# Patient Record
Sex: MALE | Race: BLACK OR AFRICAN AMERICAN | Employment: UNEMPLOYED | URBAN - METROPOLITAN AREA
[De-identification: names, ages, dates, MRNs, and addresses within clinical notes are randomized per-mention and may not be internally consistent; named-entity substitution may affect disease eponyms.]

---

## 2017-10-30 ENCOUNTER — HOSPITAL ENCOUNTER (OUTPATIENT)
Dept: SLEEP CENTER | Age: 62
Discharge: HOME | End: 2017-10-30
Payer: MEDICARE

## 2017-10-30 VITALS
HEIGHT: 71 IN | DIASTOLIC BLOOD PRESSURE: 73 MMHG | WEIGHT: 213 LBS | SYSTOLIC BLOOD PRESSURE: 125 MMHG | RESPIRATION RATE: 22 BRPM | HEART RATE: 78 BPM | BODY MASS INDEX: 29.82 KG/M2

## 2017-10-30 DIAGNOSIS — G47.33 OSA (OBSTRUCTIVE SLEEP APNEA): Primary | ICD-10-CM

## 2017-10-30 PROCEDURE — 95810 POLYSOM 6/> YRS 4/> PARAM: CPT

## 2017-10-30 RX ORDER — SIMVASTATIN 40 MG
40 TABLET ORAL DAILY
COMMUNITY

## 2017-10-30 RX ORDER — DILTIAZEM HYDROCHLORIDE 120 MG/1
120 CAPSULE, EXTENDED RELEASE ORAL DAILY
COMMUNITY

## 2017-10-30 RX ORDER — BUPROPION HYDROCHLORIDE 150 MG/1
150 TABLET ORAL DAILY
COMMUNITY

## 2017-10-30 RX ORDER — LISINOPRIL 10 MG/1
10 TABLET ORAL DAILY
COMMUNITY

## 2017-10-30 RX ORDER — GABAPENTIN 100 MG/1
100 CAPSULE ORAL DAILY
COMMUNITY

## 2017-10-30 RX ORDER — TAMSULOSIN HYDROCHLORIDE 0.4 MG/1
0.4 CAPSULE ORAL DAILY
COMMUNITY

## 2017-10-30 RX ORDER — ALPRAZOLAM 0.5 MG/1
0.5 TABLET ORAL NIGHTLY
COMMUNITY

## 2017-10-30 RX ORDER — ALBUTEROL SULFATE 90 UG/1
2 AEROSOL, METERED RESPIRATORY (INHALATION) EVERY 4 HOURS
COMMUNITY

## 2017-10-30 RX ORDER — ALBUTEROL SULFATE 2.5 MG/3ML
2.5 SOLUTION RESPIRATORY (INHALATION) 4 TIMES DAILY
COMMUNITY

## 2017-10-30 RX ORDER — METOPROLOL SUCCINATE 25 MG/1
25 TABLET, EXTENDED RELEASE ORAL DAILY
COMMUNITY

## 2017-11-09 ENCOUNTER — HOSPITAL ENCOUNTER (OUTPATIENT)
Dept: PULMONOLOGY | Age: 62
Setting detail: THERAPIES SERIES
Discharge: HOME | End: 2017-11-09
Payer: MEDICARE

## 2017-11-09 VITALS — BODY MASS INDEX: 31.94 KG/M2 | WEIGHT: 229 LBS

## 2017-11-09 PROCEDURE — G0424 PULMONARY REHAB W EXER: HCPCS

## 2018-01-10 ENCOUNTER — HOSPITAL ENCOUNTER (OUTPATIENT)
Dept: PULMONOLOGY | Age: 63
Setting detail: THERAPIES SERIES
Discharge: HOME | End: 2018-01-10
Payer: MEDICARE

## 2018-01-10 VITALS — BODY MASS INDEX: 31.52 KG/M2 | WEIGHT: 226 LBS

## 2018-01-10 PROCEDURE — G0424 PULMONARY REHAB W EXER: HCPCS

## 2019-12-04 ENCOUNTER — APPOINTMENT (OUTPATIENT)
Dept: GENERAL RADIOLOGY | Age: 64
End: 2019-12-04
Payer: MEDICARE

## 2019-12-04 ENCOUNTER — HOSPITAL ENCOUNTER (EMERGENCY)
Age: 64
Discharge: HOME | End: 2019-12-04
Attending: EMERGENCY MEDICINE
Payer: MEDICARE

## 2019-12-04 VITALS
HEART RATE: 86 BPM | RESPIRATION RATE: 21 BRPM | TEMPERATURE: 98.2 F | HEIGHT: 71 IN | SYSTOLIC BLOOD PRESSURE: 129 MMHG | OXYGEN SATURATION: 96 % | DIASTOLIC BLOOD PRESSURE: 77 MMHG | BODY MASS INDEX: 30.8 KG/M2 | WEIGHT: 220 LBS

## 2019-12-04 DIAGNOSIS — T17.308A CHOKING, INITIAL ENCOUNTER: Primary | ICD-10-CM

## 2019-12-04 PROCEDURE — 71048 X-RAY EXAM CHEST 4+ VIEWS: CPT

## 2019-12-04 PROCEDURE — 99284 EMERGENCY DEPT VISIT MOD MDM: CPT

## 2019-12-04 ASSESSMENT — ENCOUNTER SYMPTOMS
NAUSEA: 0
EYE REDNESS: 0
CHEST TIGHTNESS: 0
COLOR CHANGE: 0
WHEEZING: 0
SORE THROAT: 0
CONSTIPATION: 0
BLOOD IN STOOL: 0
DIARRHEA: 0
COUGH: 1
RHINORRHEA: 0
TROUBLE SWALLOWING: 0
SINUS PRESSURE: 0
CHOKING: 1
EYE DISCHARGE: 0
FACIAL SWELLING: 0
VOMITING: 0
SHORTNESS OF BREATH: 1
EYE PAIN: 0
BACK PAIN: 0
ABDOMINAL PAIN: 0

## 2019-12-04 ASSESSMENT — PAIN DESCRIPTION - LOCATION: LOCATION: HIP

## 2019-12-04 ASSESSMENT — PAIN DESCRIPTION - PAIN TYPE: TYPE: CHRONIC PAIN

## 2021-07-01 ENCOUNTER — HOSPITAL ENCOUNTER (OUTPATIENT)
Age: 66
Setting detail: SPECIMEN
Discharge: HOME | End: 2021-07-01
Payer: MEDICARE

## 2021-07-01 LAB
AMPHETAMINE SCREEN URINE: NEGATIVE
BARBITURATE SCREEN URINE: NEGATIVE
BENZODIAZEPINE SCREEN, URINE: POSITIVE
BUPRENORPHINE URINE: ABNORMAL
CANNABINOID SCREEN URINE: NEGATIVE
COCAINE METABOLITE, URINE: NEGATIVE
MDMA URINE: ABNORMAL
METHADONE SCREEN, URINE: NEGATIVE
METHAMPHETAMINE, URINE: ABNORMAL
OPIATES, URINE: NEGATIVE
OXYCODONE SCREEN URINE: NEGATIVE
PHENCYCLIDINE, URINE: NEGATIVE
PROPOXYPHENE, URINE: ABNORMAL
TEST INFORMATION: ABNORMAL
TRICYCLIC ANTIDEPRESSANTS, UR: ABNORMAL

## 2021-10-22 ENCOUNTER — HOSPITAL ENCOUNTER (OUTPATIENT)
Age: 66
Setting detail: SPECIMEN
Discharge: HOME | End: 2021-10-22
Payer: MEDICARE

## 2021-10-22 LAB
-: NORMAL
REASON FOR REJECTION: NORMAL
ZZ NTE CLEAN UP: ORDERED TEST: NORMAL
ZZ NTE WITH NAME CLEAN UP: SPECIMEN SOURCE: NORMAL

## 2021-10-25 ENCOUNTER — HOSPITAL ENCOUNTER (OUTPATIENT)
Age: 66
Setting detail: SPECIMEN
Discharge: HOME | End: 2021-10-25
Payer: MEDICARE

## 2021-10-27 LAB
ALCOHOL URINE: NEGATIVE MG/DL
AMPHETAMINE SCREEN URINE: NEGATIVE NG/ML
BARBITURATE SCREEN URINE: NEGATIVE NG/ML
BENZODIAZEPINE SCREEN, URINE: NEGATIVE NG/ML
CANNABINOID SCREEN URINE: NEGATIVE NG/ML
COCAINE(METAB.)SCREEN, URINE: NEGATIVE NG/ML
CREATININE URINE: 23.7 MG/DL (ref 20–400)
Lab: NORMAL
METHADONE SCREEN, URINE: NEGATIVE NG/ML
OPIATES, URINE: NEGATIVE NG/ML
PHENCYCLIDINE, URINE: NEGATIVE NG/ML
PROPOXYPHENE, URINE: NEGATIVE NG/ML

## 2021-11-17 ENCOUNTER — HOSPITAL ENCOUNTER (OUTPATIENT)
Age: 66
Setting detail: SPECIMEN
Discharge: HOME | End: 2021-11-17

## 2021-11-20 LAB
ALCOHOL URINE: NEGATIVE MG/DL
AMPHETAMINE SCREEN URINE: NEGATIVE NG/ML
BARBITURATE SCREEN URINE: NEGATIVE NG/ML
BENZODIAZEPINE SCREEN, URINE: POSITIVE NG/ML
CANNABINOID SCREEN URINE: NEGATIVE NG/ML
COCAINE(METAB.)SCREEN, URINE: NEGATIVE NG/ML
CREATININE URINE: 155.3 MG/DL (ref 20–400)
Lab: NORMAL
METHADONE SCREEN, URINE: NEGATIVE NG/ML
OPIATES, URINE: NEGATIVE NG/ML
PHENCYCLIDINE, URINE: NEGATIVE NG/ML
PROPOXYPHENE, URINE: NEGATIVE NG/ML

## 2021-11-21 LAB
ALPRAZOLAM: 116 NG/ML
CHLORDIAZEPOXIDE: <20 NG/ML
CLONAZEPAM, URINE SCREEN: <5 NG/ML
DIAZEPAM URINE QUANT: <20 NG/ML
LORAZEPAM: <20 NG/ML
MIDAZOLAM, URINE SCREEN: <20 NG/ML
MIDAZOLAM, URINE SCREEN: <20 NG/ML
NORDIAZEPAM URINE SCREEN: <20 NG/ML
OH-ALPRAZOLAM SCREEN: 167 NG/ML
OXAZEPAM URINE SCREEN: <20 NG/ML
TEMAZEPAM,URINE SCREEN: <20 NG/ML
UR 7-AMINOCLONAZEPAM: <5 NG/ML

## 2023-08-24 ENCOUNTER — HOSPITAL ENCOUNTER (OUTPATIENT)
Age: 68
Setting detail: SPECIMEN
Discharge: HOME | End: 2023-08-24

## 2023-08-24 LAB
ANION GAP SERPL CALCULATED.3IONS-SCNC: 15 MMOL/L (ref 9–17)
BUN SERPL-MCNC: 25 MG/DL (ref 8–23)
CALCIUM SERPL-MCNC: 9.9 MG/DL (ref 8.6–10.4)
CHLORIDE SERPL-SCNC: 97 MMOL/L (ref 98–107)
CHOLEST SERPL-MCNC: 276 MG/DL
CHOLESTEROL/HDL RATIO: 3.4
CO2 SERPL-SCNC: 27 MMOL/L (ref 20–31)
CREAT SERPL-MCNC: 1.1 MG/DL (ref 0.7–1.2)
ERYTHROCYTE [DISTWIDTH] IN BLOOD BY AUTOMATED COUNT: 14.4 % (ref 11.8–14.4)
EST. AVERAGE GLUCOSE BLD GHB EST-MCNC: 258 MG/DL
GFR SERPL CREATININE-BSD FRML MDRD: >60 ML/MIN/1.73M2
GLUCOSE SERPL-MCNC: 217 MG/DL (ref 70–99)
HBA1C MFR BLD: 10.6 % (ref 4–6)
HCT VFR BLD AUTO: 42.2 % (ref 40.7–50.3)
HDLC SERPL-MCNC: 81 MG/DL
HGB BLD-MCNC: 13.8 G/DL (ref 13–17)
LDLC SERPL CALC-MCNC: 153 MG/DL (ref 0–130)
MAGNESIUM SERPL-MCNC: 1.8 MG/DL (ref 1.6–2.6)
MCH RBC QN AUTO: 32.2 PG (ref 25.2–33.5)
MCHC RBC AUTO-ENTMCNC: 32.7 G/DL (ref 28.4–34.8)
MCV RBC AUTO: 98.6 FL (ref 82.6–102.9)
NRBC BLD-RTO: 0 PER 100 WBC
PLATELET # BLD AUTO: 142 K/UL (ref 138–453)
PMV BLD AUTO: 11.3 FL (ref 8.1–13.5)
POTASSIUM SERPL-SCNC: 4.1 MMOL/L (ref 3.7–5.3)
PSA SERPL-MCNC: 2.32 NG/ML
RBC # BLD AUTO: 4.28 M/UL (ref 4.21–5.77)
SODIUM SERPL-SCNC: 139 MMOL/L (ref 135–144)
TRIGL SERPL-MCNC: 211 MG/DL
TSH SERPL DL<=0.05 MIU/L-ACNC: 0.71 UIU/ML (ref 0.3–5)
WBC OTHER # BLD: 11 K/UL (ref 3.5–11.3)

## 2024-06-26 ENCOUNTER — HOSPITAL ENCOUNTER (OUTPATIENT)
Age: 69
Setting detail: SPECIMEN
Discharge: HOME | End: 2024-06-26

## 2024-06-26 LAB
25(OH)D3 SERPL-MCNC: 15.7 NG/ML (ref 30–100)
ALBUMIN SERPL-MCNC: 4.2 G/DL (ref 3.5–5.2)
ALBUMIN/GLOB SERPL: 1 {RATIO} (ref 1–2.5)
ALP SERPL-CCNC: 117 U/L (ref 40–129)
ALT SERPL-CCNC: 29 U/L (ref 10–50)
ANION GAP SERPL CALCULATED.3IONS-SCNC: 12 MMOL/L (ref 9–16)
AST SERPL-CCNC: 21 U/L (ref 10–50)
BILIRUB SERPL-MCNC: 0.3 MG/DL (ref 0–1.2)
BNP SERPL-MCNC: 41 PG/ML (ref 0–300)
BUN SERPL-MCNC: 23 MG/DL (ref 8–23)
CALCIUM SERPL-MCNC: 9.6 MG/DL (ref 8.6–10.4)
CHLORIDE SERPL-SCNC: 95 MMOL/L (ref 98–107)
CHOLEST SERPL-MCNC: 255 MG/DL (ref 0–199)
CHOLESTEROL/HDL RATIO: 3
CO2 SERPL-SCNC: 31 MMOL/L (ref 20–31)
CREAT SERPL-MCNC: 1.1 MG/DL (ref 0.7–1.2)
ERYTHROCYTE [DISTWIDTH] IN BLOOD BY AUTOMATED COUNT: 14.7 % (ref 11.8–14.4)
EST. AVERAGE GLUCOSE BLD GHB EST-MCNC: 289 MG/DL
FOLATE SERPL-MCNC: 12.5 NG/ML (ref 4.8–24.2)
GFR, ESTIMATED: 77 ML/MIN/1.73M2
GLUCOSE SERPL-MCNC: 295 MG/DL (ref 74–99)
HBA1C MFR BLD: 11.7 % (ref 4–6)
HCT VFR BLD AUTO: 43.2 % (ref 40.7–50.3)
HDLC SERPL-MCNC: 87 MG/DL
HGB BLD-MCNC: 13.4 G/DL (ref 13–17)
INR PPP: 1.1
LDLC SERPL CALC-MCNC: 128 MG/DL (ref 0–100)
MAGNESIUM SERPL-MCNC: 1.8 MG/DL (ref 1.6–2.4)
MCH RBC QN AUTO: 31.3 PG (ref 25.2–33.5)
MCHC RBC AUTO-ENTMCNC: 31 G/DL (ref 28.4–34.8)
MCV RBC AUTO: 100.9 FL (ref 82.6–102.9)
NRBC BLD-RTO: 0 PER 100 WBC
PLATELET # BLD AUTO: 148 K/UL (ref 138–453)
PMV BLD AUTO: 11.7 FL (ref 8.1–13.5)
POTASSIUM SERPL-SCNC: 4.7 MMOL/L (ref 3.7–5.3)
PROT SERPL-MCNC: 7.1 G/DL (ref 6.6–8.7)
PROTHROMBIN TIME: 13.6 SEC (ref 11.7–14.9)
RBC # BLD AUTO: 4.28 M/UL (ref 4.21–5.77)
SODIUM SERPL-SCNC: 138 MMOL/L (ref 136–145)
TRIGL SERPL-MCNC: 198 MG/DL
TSH SERPL DL<=0.05 MIU/L-ACNC: 0.73 UIU/ML (ref 0.27–4.2)
VIT B12 SERPL-MCNC: 493 PG/ML (ref 232–1245)
VLDLC SERPL CALC-MCNC: 40 MG/DL
WBC OTHER # BLD: 11.9 K/UL (ref 3.5–11.3)

## 2024-10-04 ENCOUNTER — HOSPITAL ENCOUNTER (OUTPATIENT)
Age: 69
Setting detail: SPECIMEN
Discharge: HOME | End: 2024-10-04

## 2024-10-04 LAB
ANION GAP SERPL CALCULATED.3IONS-SCNC: 10 MMOL/L (ref 9–16)
BUN SERPL-MCNC: 24 MG/DL (ref 8–23)
CALCIUM SERPL-MCNC: 9.1 MG/DL (ref 8.6–10.4)
CHLORIDE SERPL-SCNC: 98 MMOL/L (ref 98–107)
CO2 SERPL-SCNC: 32 MMOL/L (ref 20–31)
CREAT SERPL-MCNC: 1.3 MG/DL (ref 0.7–1.2)
EST. AVERAGE GLUCOSE BLD GHB EST-MCNC: 240 MG/DL
GFR, ESTIMATED: 62 ML/MIN/1.73M2
GLUCOSE SERPL-MCNC: 143 MG/DL (ref 74–99)
HBA1C MFR BLD: 10 % (ref 4–6)
POTASSIUM SERPL-SCNC: 4 MMOL/L (ref 3.7–5.3)
SODIUM SERPL-SCNC: 140 MMOL/L (ref 136–145)

## 2024-12-09 ENCOUNTER — HOSPITAL ENCOUNTER (OUTPATIENT)
Age: 69
Setting detail: SPECIMEN
Discharge: HOME | End: 2024-12-09

## 2024-12-09 LAB
25(OH)D3 SERPL-MCNC: 14.5 NG/ML (ref 30–100)
ALBUMIN SERPL-MCNC: 3.2 G/DL (ref 3.5–5.2)
ALBUMIN/GLOB SERPL: 1 {RATIO} (ref 1–2.5)
ALP SERPL-CCNC: 113 U/L (ref 40–129)
ALT SERPL-CCNC: 19 U/L (ref 10–50)
ANION GAP SERPL CALCULATED.3IONS-SCNC: 12 MMOL/L (ref 9–16)
AST SERPL-CCNC: 13 U/L (ref 10–50)
BASOPHILS # BLD: 0.03 K/UL (ref 0–0.2)
BASOPHILS NFR BLD: 0 % (ref 0–2)
BILIRUB SERPL-MCNC: 0.7 MG/DL (ref 0–1.2)
BUN SERPL-MCNC: 35 MG/DL (ref 8–23)
CALCIUM SERPL-MCNC: 9.6 MG/DL (ref 8.8–10.2)
CHLORIDE SERPL-SCNC: 96 MMOL/L (ref 98–107)
CO2 SERPL-SCNC: 30 MMOL/L (ref 20–31)
CREAT SERPL-MCNC: 1.4 MG/DL (ref 0.7–1.2)
EOSINOPHIL # BLD: 0.1 K/UL (ref 0–0.44)
EOSINOPHILS RELATIVE PERCENT: 1 % (ref 1–4)
ERYTHROCYTE [DISTWIDTH] IN BLOOD BY AUTOMATED COUNT: 13.8 % (ref 11.8–14.4)
GFR, ESTIMATED: 52 ML/MIN/1.73M2
GLUCOSE SERPL-MCNC: 172 MG/DL (ref 82–115)
HCT VFR BLD AUTO: 42.3 % (ref 40.7–50.3)
HGB BLD-MCNC: 13.9 G/DL (ref 13–17)
IMM GRANULOCYTES # BLD AUTO: 0.3 K/UL (ref 0–0.3)
IMM GRANULOCYTES NFR BLD: 2 %
LYMPHOCYTES NFR BLD: 2.45 K/UL (ref 1.1–3.7)
LYMPHOCYTES RELATIVE PERCENT: 19 % (ref 24–43)
MAGNESIUM SERPL-MCNC: 1.9 MG/DL (ref 1.6–2.4)
MCH RBC QN AUTO: 31.9 PG (ref 25.2–33.5)
MCHC RBC AUTO-ENTMCNC: 32.9 G/DL (ref 28.4–34.8)
MCV RBC AUTO: 97 FL (ref 82.6–102.9)
MONOCYTES NFR BLD: 1.17 K/UL (ref 0.1–1.2)
MONOCYTES NFR BLD: 9 % (ref 3–12)
NEUTROPHILS NFR BLD: 69 % (ref 36–65)
NEUTS SEG NFR BLD: 8.91 K/UL (ref 1.5–8.1)
NRBC BLD-RTO: 0 PER 100 WBC
PLATELET # BLD AUTO: 178 K/UL (ref 138–453)
PMV BLD AUTO: 11.3 FL (ref 8.1–13.5)
POTASSIUM SERPL-SCNC: 3.8 MMOL/L (ref 3.7–5.3)
PREALB SERPL-MCNC: 24.2 MG/DL (ref 20–40)
PROT SERPL-MCNC: 6.4 G/DL (ref 6.6–8.7)
PSA SERPL-MCNC: 2.4 NG/ML (ref 0–4)
RBC # BLD AUTO: 4.36 M/UL (ref 4.21–5.77)
SODIUM SERPL-SCNC: 138 MMOL/L (ref 136–145)
T4 SERPL-MCNC: 7 UG/DL (ref 4.5–11.7)
TSH SERPL DL<=0.05 MIU/L-ACNC: 0.27 UIU/ML (ref 0.27–4.2)
WBC OTHER # BLD: 13 K/UL (ref 3.5–11.3)

## 2024-12-09 PROCEDURE — 84436 ASSAY OF TOTAL THYROXINE: CPT

## 2024-12-09 PROCEDURE — G0103 PSA SCREENING: HCPCS

## 2024-12-09 PROCEDURE — 36415 COLL VENOUS BLD VENIPUNCTURE: CPT

## 2024-12-09 PROCEDURE — P9603 ONE-WAY ALLOW PRORATED MILES: HCPCS

## 2024-12-09 PROCEDURE — 84134 ASSAY OF PREALBUMIN: CPT

## 2024-12-09 PROCEDURE — 83735 ASSAY OF MAGNESIUM: CPT

## 2024-12-09 PROCEDURE — 85025 COMPLETE CBC W/AUTO DIFF WBC: CPT

## 2024-12-09 PROCEDURE — 84443 ASSAY THYROID STIM HORMONE: CPT

## 2024-12-09 PROCEDURE — 82306 VITAMIN D 25 HYDROXY: CPT

## 2024-12-09 PROCEDURE — 80053 COMPREHEN METABOLIC PANEL: CPT

## 2024-12-17 ENCOUNTER — HOSPITAL ENCOUNTER (OUTPATIENT)
Age: 69
Setting detail: SPECIMEN
Discharge: HOME | End: 2024-12-17

## 2024-12-17 LAB
ANION GAP SERPL CALCULATED.3IONS-SCNC: 16 MMOL/L (ref 9–16)
BUN SERPL-MCNC: 29 MG/DL (ref 8–23)
CALCIUM SERPL-MCNC: 9.9 MG/DL (ref 8.8–10.2)
CHLORIDE SERPL-SCNC: 90 MMOL/L (ref 98–107)
CO2 SERPL-SCNC: 32 MMOL/L (ref 20–31)
CREAT SERPL-MCNC: 1.4 MG/DL (ref 0.7–1.2)
ERYTHROCYTE [DISTWIDTH] IN BLOOD BY AUTOMATED COUNT: 14 % (ref 11.8–14.4)
GFR, ESTIMATED: 55 ML/MIN/1.73M2
GLUCOSE SERPL-MCNC: 215 MG/DL (ref 82–115)
HCT VFR BLD AUTO: 44.1 % (ref 40.7–50.3)
HGB BLD-MCNC: 14.2 G/DL (ref 13–17)
MCH RBC QN AUTO: 31.3 PG (ref 25.2–33.5)
MCHC RBC AUTO-ENTMCNC: 32.2 G/DL (ref 28.4–34.8)
MCV RBC AUTO: 97.4 FL (ref 82.6–102.9)
NRBC BLD-RTO: 0.2 PER 100 WBC
PLATELET # BLD AUTO: 189 K/UL (ref 138–453)
PMV BLD AUTO: 11.7 FL (ref 8.1–13.5)
POTASSIUM SERPL-SCNC: 4.8 MMOL/L (ref 3.7–5.3)
RBC # BLD AUTO: 4.53 M/UL (ref 4.21–5.77)
SODIUM SERPL-SCNC: 137 MMOL/L (ref 136–145)
WBC OTHER # BLD: 12.2 K/UL (ref 3.5–11.3)

## 2024-12-17 PROCEDURE — 80048 BASIC METABOLIC PNL TOTAL CA: CPT

## 2024-12-17 PROCEDURE — 36415 COLL VENOUS BLD VENIPUNCTURE: CPT

## 2024-12-17 PROCEDURE — 85027 COMPLETE CBC AUTOMATED: CPT

## 2024-12-17 PROCEDURE — P9603 ONE-WAY ALLOW PRORATED MILES: HCPCS

## 2024-12-18 ENCOUNTER — APPOINTMENT (OUTPATIENT)
Dept: GENERAL RADIOLOGY | Age: 69
DRG: 189 | End: 2024-12-18
Payer: MEDICARE

## 2024-12-18 ENCOUNTER — HOSPITAL ENCOUNTER (INPATIENT)
Age: 69
LOS: 6 days | Discharge: NURSING, REHAB OR LONG-TERM CARE | DRG: 189 | End: 2024-12-24
Attending: EMERGENCY MEDICINE | Admitting: INTERNAL MEDICINE
Payer: MEDICARE

## 2024-12-18 DIAGNOSIS — R33.9 URINARY RETENTION: ICD-10-CM

## 2024-12-18 DIAGNOSIS — M19.91 PRIMARY OSTEOARTHRITIS, UNSPECIFIED SITE: ICD-10-CM

## 2024-12-18 DIAGNOSIS — R06.03 ACUTE RESPIRATORY DISTRESS: ICD-10-CM

## 2024-12-18 DIAGNOSIS — J44.1 COPD EXACERBATION (HCC): Primary | ICD-10-CM

## 2024-12-18 DIAGNOSIS — F41.9 ANXIETY: ICD-10-CM

## 2024-12-18 DIAGNOSIS — N17.9 AKI (ACUTE KIDNEY INJURY) (HCC): ICD-10-CM

## 2024-12-18 PROBLEM — J96.21 ACUTE ON CHRONIC HYPOXIC RESPIRATORY FAILURE: Status: ACTIVE | Noted: 2024-12-18

## 2024-12-18 PROBLEM — N40.0 BPH (BENIGN PROSTATIC HYPERPLASIA): Status: ACTIVE | Noted: 2024-12-18

## 2024-12-18 PROBLEM — R09.89 PULMONARY CONGESTION: Status: ACTIVE | Noted: 2024-12-18

## 2024-12-18 LAB
ALBUMIN SERPL-MCNC: 3.5 G/DL (ref 3.5–5.2)
ALBUMIN/GLOB SERPL: 1 {RATIO} (ref 1–2.5)
ALP SERPL-CCNC: 118 U/L (ref 40–129)
ALT SERPL-CCNC: 25 U/L (ref 10–50)
ANION GAP SERPL CALCULATED.3IONS-SCNC: 16 MMOL/L (ref 9–16)
AST SERPL-CCNC: 26 U/L (ref 10–50)
BACTERIA URNS QL MICRO: NORMAL
BASOPHILS # BLD: <0.03 K/UL (ref 0–0.2)
BASOPHILS NFR BLD: 0 % (ref 0–2)
BILIRUB SERPL-MCNC: 0.3 MG/DL (ref 0–1.2)
BILIRUB UR QL STRIP: NEGATIVE
BNP SERPL-MCNC: 120 PG/ML (ref 0–125)
BODY TEMPERATURE: 37
BUN SERPL-MCNC: 42 MG/DL (ref 8–23)
CA-I BLD-SCNC: 1.15 MMOL/L (ref 1.13–1.33)
CALCIUM SERPL-MCNC: 9 MG/DL (ref 8.6–10.4)
CASTS #/AREA URNS LPF: NORMAL /LPF (ref 0–8)
CHLORIDE SERPL-SCNC: 90 MMOL/L (ref 98–107)
CLARITY UR: CLEAR
CO2 SERPL-SCNC: 28 MMOL/L (ref 20–31)
COHGB MFR BLD: 1.8 % (ref 0–5)
COLOR UR: YELLOW
CREAT SERPL-MCNC: 1.7 MG/DL (ref 0.7–1.2)
EOSINOPHIL # BLD: <0.03 K/UL (ref 0–0.44)
EOSINOPHILS RELATIVE PERCENT: 0 % (ref 1–4)
EPI CELLS #/AREA URNS HPF: NORMAL /HPF (ref 0–5)
ERYTHROCYTE [DISTWIDTH] IN BLOOD BY AUTOMATED COUNT: 14.1 % (ref 11.8–14.4)
FIO2 ON VENT: ABNORMAL %
GFR, ESTIMATED: 43 ML/MIN/1.73M2
GLUCOSE SERPL-MCNC: 327 MG/DL (ref 74–99)
GLUCOSE UR STRIP-MCNC: ABNORMAL MG/DL
HCO3 VENOUS: 25.7 MMOL/L (ref 24–30)
HCT VFR BLD AUTO: 42.7 % (ref 40.7–50.3)
HGB BLD-MCNC: 13.4 G/DL (ref 13–17)
HGB UR QL STRIP.AUTO: ABNORMAL
IMM GRANULOCYTES # BLD AUTO: 0.16 K/UL (ref 0–0.3)
IMM GRANULOCYTES NFR BLD: 2 %
KETONES UR STRIP-MCNC: NEGATIVE MG/DL
LEUKOCYTE ESTERASE UR QL STRIP: NEGATIVE
LYMPHOCYTES NFR BLD: 1.5 K/UL (ref 1.1–3.7)
LYMPHOCYTES RELATIVE PERCENT: 16 % (ref 24–43)
MCH RBC QN AUTO: 30.8 PG (ref 25.2–33.5)
MCHC RBC AUTO-ENTMCNC: 31.4 G/DL (ref 28.4–34.8)
MCV RBC AUTO: 98.2 FL (ref 82.6–102.9)
MONOCYTES NFR BLD: 0.6 K/UL (ref 0.1–1.2)
MONOCYTES NFR BLD: 6 % (ref 3–12)
NEUTROPHILS NFR BLD: 76 % (ref 36–65)
NEUTS SEG NFR BLD: 7.36 K/UL (ref 1.5–8.1)
NITRITE UR QL STRIP: NEGATIVE
NRBC BLD-RTO: 0 PER 100 WBC
O2 SAT, VEN: 88.7 % (ref 60–85)
PCO2 VENOUS: 42.8 MM HG (ref 39–55)
PH UR STRIP: 5.5 [PH] (ref 5–8)
PH VENOUS: 7.4 (ref 7.32–7.42)
PLATELET # BLD AUTO: 204 K/UL (ref 138–453)
PMV BLD AUTO: 11.8 FL (ref 8.1–13.5)
PO2 VENOUS: 55.4 MM HG (ref 30–50)
POSITIVE BASE EXCESS, VEN: 0.7 MMOL/L (ref 0–2)
POTASSIUM SERPL-SCNC: 5.1 MMOL/L (ref 3.7–5.3)
PROT SERPL-MCNC: 7.1 G/DL (ref 6.6–8.7)
PROT UR STRIP-MCNC: NEGATIVE MG/DL
RBC # BLD AUTO: 4.35 M/UL (ref 4.21–5.77)
RBC #/AREA URNS HPF: NORMAL /HPF (ref 0–4)
SODIUM SERPL-SCNC: 134 MMOL/L (ref 136–145)
SP GR UR STRIP: 1.01 (ref 1–1.03)
TROPONIN I SERPL HS-MCNC: 29 NG/L (ref 0–22)
TROPONIN I SERPL HS-MCNC: 35 NG/L (ref 0–22)
UROBILINOGEN UR STRIP-ACNC: NORMAL EU/DL (ref 0–1)
WBC #/AREA URNS HPF: NORMAL /HPF (ref 0–5)
WBC OTHER # BLD: 9.7 K/UL (ref 3.5–11.3)

## 2024-12-18 PROCEDURE — 82805 BLOOD GASES W/O2 SATURATION: CPT

## 2024-12-18 PROCEDURE — 94660 CPAP INITIATION&MGMT: CPT

## 2024-12-18 PROCEDURE — 5A09357 ASSISTANCE WITH RESPIRATORY VENTILATION, LESS THAN 24 CONSECUTIVE HOURS, CONTINUOUS POSITIVE AIRWAY PRESSURE: ICD-10-PCS | Performed by: EMERGENCY MEDICINE

## 2024-12-18 PROCEDURE — 81001 URINALYSIS AUTO W/SCOPE: CPT

## 2024-12-18 PROCEDURE — 71045 X-RAY EXAM CHEST 1 VIEW: CPT

## 2024-12-18 PROCEDURE — 51798 US URINE CAPACITY MEASURE: CPT

## 2024-12-18 PROCEDURE — 82330 ASSAY OF CALCIUM: CPT

## 2024-12-18 PROCEDURE — 84484 ASSAY OF TROPONIN QUANT: CPT

## 2024-12-18 PROCEDURE — 83880 ASSAY OF NATRIURETIC PEPTIDE: CPT

## 2024-12-18 PROCEDURE — 36415 COLL VENOUS BLD VENIPUNCTURE: CPT

## 2024-12-18 PROCEDURE — 93005 ELECTROCARDIOGRAM TRACING: CPT

## 2024-12-18 PROCEDURE — 99285 EMERGENCY DEPT VISIT HI MDM: CPT

## 2024-12-18 PROCEDURE — 6360000002 HC RX W HCPCS

## 2024-12-18 PROCEDURE — 80053 COMPREHEN METABOLIC PANEL: CPT

## 2024-12-18 PROCEDURE — 85025 COMPLETE CBC W/AUTO DIFF WBC: CPT

## 2024-12-18 PROCEDURE — 96365 THER/PROPH/DIAG IV INF INIT: CPT

## 2024-12-18 PROCEDURE — 2060000000 HC ICU INTERMEDIATE R&B

## 2024-12-18 RX ORDER — ACETAMINOPHEN 650 MG/1
650 SUPPOSITORY RECTAL EVERY 6 HOURS PRN
Status: DISCONTINUED | OUTPATIENT
Start: 2024-12-18 | End: 2024-12-24 | Stop reason: HOSPADM

## 2024-12-18 RX ORDER — ROFLUMILAST 500 UG/1
500 TABLET ORAL DAILY
Status: DISCONTINUED | OUTPATIENT
Start: 2024-12-19 | End: 2024-12-24 | Stop reason: HOSPADM

## 2024-12-18 RX ORDER — SODIUM CHLORIDE 9 MG/ML
INJECTION, SOLUTION INTRAVENOUS PRN
Status: DISCONTINUED | OUTPATIENT
Start: 2024-12-18 | End: 2024-12-24 | Stop reason: HOSPADM

## 2024-12-18 RX ORDER — ACETAMINOPHEN 325 MG/1
650 TABLET ORAL EVERY 6 HOURS PRN
Status: DISCONTINUED | OUTPATIENT
Start: 2024-12-18 | End: 2024-12-24 | Stop reason: HOSPADM

## 2024-12-18 RX ORDER — MAGNESIUM SULFATE IN WATER 40 MG/ML
2000 INJECTION, SOLUTION INTRAVENOUS ONCE
Status: COMPLETED | OUTPATIENT
Start: 2024-12-18 | End: 2024-12-18

## 2024-12-18 RX ORDER — ALBUTEROL SULFATE 0.83 MG/ML
2.5 SOLUTION RESPIRATORY (INHALATION)
Status: DISCONTINUED | OUTPATIENT
Start: 2024-12-18 | End: 2024-12-24 | Stop reason: HOSPADM

## 2024-12-18 RX ORDER — TAMSULOSIN HYDROCHLORIDE 0.4 MG/1
0.4 CAPSULE ORAL DAILY
Status: DISCONTINUED | OUTPATIENT
Start: 2024-12-19 | End: 2024-12-24 | Stop reason: HOSPADM

## 2024-12-18 RX ORDER — SODIUM CHLORIDE 0.9 % (FLUSH) 0.9 %
5-40 SYRINGE (ML) INJECTION EVERY 12 HOURS SCHEDULED
Status: DISCONTINUED | OUTPATIENT
Start: 2024-12-18 | End: 2024-12-24 | Stop reason: HOSPADM

## 2024-12-18 RX ORDER — POLYETHYLENE GLYCOL 3350 17 G/17G
17 POWDER, FOR SOLUTION ORAL DAILY PRN
Status: DISCONTINUED | OUTPATIENT
Start: 2024-12-18 | End: 2024-12-24 | Stop reason: HOSPADM

## 2024-12-18 RX ORDER — GLUCAGON 1 MG/ML
1 KIT INJECTION PRN
Status: DISCONTINUED | OUTPATIENT
Start: 2024-12-18 | End: 2024-12-24 | Stop reason: HOSPADM

## 2024-12-18 RX ORDER — DEXTROSE MONOHYDRATE 100 MG/ML
INJECTION, SOLUTION INTRAVENOUS CONTINUOUS PRN
Status: DISCONTINUED | OUTPATIENT
Start: 2024-12-18 | End: 2024-12-24 | Stop reason: HOSPADM

## 2024-12-18 RX ORDER — ATORVASTATIN CALCIUM 20 MG/1
20 TABLET, FILM COATED ORAL DAILY
Status: DISCONTINUED | OUTPATIENT
Start: 2024-12-19 | End: 2024-12-24 | Stop reason: HOSPADM

## 2024-12-18 RX ORDER — SODIUM CHLORIDE 0.9 % (FLUSH) 0.9 %
5-40 SYRINGE (ML) INJECTION PRN
Status: DISCONTINUED | OUTPATIENT
Start: 2024-12-18 | End: 2024-12-24 | Stop reason: HOSPADM

## 2024-12-18 RX ORDER — ASPIRIN 81 MG/1
81 TABLET ORAL DAILY
Status: DISCONTINUED | OUTPATIENT
Start: 2024-12-19 | End: 2024-12-24 | Stop reason: HOSPADM

## 2024-12-18 RX ORDER — ONDANSETRON 4 MG/1
4 TABLET, ORALLY DISINTEGRATING ORAL EVERY 8 HOURS PRN
Status: DISCONTINUED | OUTPATIENT
Start: 2024-12-18 | End: 2024-12-24 | Stop reason: HOSPADM

## 2024-12-18 RX ORDER — SODIUM CHLORIDE 9 MG/ML
INJECTION, SOLUTION INTRAVENOUS CONTINUOUS
Status: DISCONTINUED | OUTPATIENT
Start: 2024-12-18 | End: 2024-12-19

## 2024-12-18 RX ORDER — ONDANSETRON 2 MG/ML
4 INJECTION INTRAMUSCULAR; INTRAVENOUS EVERY 6 HOURS PRN
Status: DISCONTINUED | OUTPATIENT
Start: 2024-12-18 | End: 2024-12-24 | Stop reason: HOSPADM

## 2024-12-18 RX ORDER — INSULIN LISPRO 100 [IU]/ML
0-4 INJECTION, SOLUTION INTRAVENOUS; SUBCUTANEOUS EVERY 4 HOURS
Status: DISCONTINUED | OUTPATIENT
Start: 2024-12-18 | End: 2024-12-19

## 2024-12-18 RX ADMIN — MAGNESIUM SULFATE HEPTAHYDRATE 2000 MG: 40 INJECTION, SOLUTION INTRAVENOUS at 18:03

## 2024-12-18 NOTE — ED PROVIDER NOTES
my urgent intervention.  Total critical care time was less than 30 minutes.  This excludes any time for separately reportable procedures.       Shyam Santos MD, FACEP, FAAEM  Attending Emergency  Physician           Shyam Santos MD  12/18/24 1389

## 2024-12-18 NOTE — ED NOTES
Pt arrived to ED with report of difficulty breathing.   Per report patient has been SOB all day and placed on bipap PTA,   Pt reports hx of emphysema and has had his right lung removed  Pt states he was at [facility] and had his bee catheter removed to trial urinating without but states he was unable and needs catheter replaced.  Pt A&Ox4, call light within reach, pt attached to full cardiac monitor and RT @ bedside on arrival.   Pt has dexacom glucose monitor noted to right upper arm

## 2024-12-18 NOTE — ED PROVIDER NOTES
[facility] EMERGENCY DEPARTMENT  Emergency Department Encounter  Emergency Medicine Resident     Pt Name:Salvador Hatch  MRN: [id number]  Birthdate 1955  Date of evaluation: 12/18/24  PCP:  No primary care provider on file.  Note Started: 5:42 PM EST      CHIEF COMPLAINT       Chief Complaint   Patient presents with    Shortness of Breath       HISTORY OF PRESENT ILLNESS  (Location/Symptom, Timing/Onset, Context/Setting, Quality, Duration, Modifying Factors, Severity.)      Salvador Hatch is a 69 y.o. male with history of atrial flutter on Eliquis, COPD, urinary retention who presents from [facility] for apparent urinary retention secondary to BPH.  Patient states that he lives at [facility], went to [facility], and had a Boston taken out but was unable to be replaced due to concerns with his breathing apparently.    Patient was sent here via EMS, however on EMS run report as the patient was sent from [facility] for significant desaturations to 80% on his CPAP while there 20 cm of H2O.  According EMS, patient was a 85% while on their CPAP, and received 1 albuterol treatment and 1 Combivent treatment.    On arrival, patient was switched over to our BiPAP, and the approximate minute that he was off of CPAP patient was saturating near 98%.  Patient however having some respiratory distress at that time.    Will continue to assess.  Will bladder scan for urinary retention, will obtain UA to evaluate for UTI.    PAST MEDICAL / SURGICAL / SOCIAL / FAMILY HISTORY      has a past medical history of Atrial flutter (HCC), CAD (coronary artery disease), COPD (chronic obstructive pulmonary disease) (HCC), and Osteoarthritis.       has no past surgical history on file.      Social History     Socioeconomic History    Marital status:      Spouse name: Not on file    Number of children: Not on file    Years of education: Not on file    Highest education level: Not on file    Occupational History    Not on file   Tobacco Use    Smoking status: Not on file    Smokeless tobacco: Never   Substance and Sexual Activity    Alcohol use: Not on file    Drug use: Not on file    Sexual activity: Not on file   Other Topics Concern    Not on file   Social History Narrative    Not on file     Social Determinants of Health     Financial Resource Strain: Low Risk  (5/1/2024)    Received from [facility]    Overall Financial Resource Strain (CARDIA)     Difficulty of Paying Living Expenses: Not hard at all   Food Insecurity: No Food Insecurity (12/2/2024)    Received [facility]    Hunger Screening     Within the past 12 months we worried whether our food would run out before we got money to buy more.: Never True     Within the past 12 months the food we bought just didn't last and we didn't have money to get more.: Never True   Transportation Needs: No Transportation Needs (12/2/2024)    Received from [facility]    PRAPARE - Transportation     Lack of Transportation (Medical): No     Lack of Transportation (Non-Medical): No   Physical Activity: Not on file   Stress: No Stress Concern Present (5/1/2024)    Received from [facility] [place] [facility] - Occupational Stress Questionnaire     Feeling of Stress : Only a little   Social Connections: Socially Isolated (5/1/2024)    Received from [facility]    Social Connection and Isolation Panel [NHANES]     Frequency of Communication with Friends and Family: More than three times a week     Frequency of Social Gatherings with Friends and Family: More than three times a week     Attends Hindu Services: Never     Active Member of Clubs or Organizations: No     Attends Club or Organization Meetings: Never     Marital Status:    Intimate Partner Violence: Not At Risk (9/19/2024)    Received from [facility]    Humiliation, Afraid, Rape, and

## 2024-12-19 ENCOUNTER — APPOINTMENT (OUTPATIENT)
Dept: CT IMAGING | Age: 69
DRG: 189 | End: 2024-12-19
Payer: MEDICARE

## 2024-12-19 ENCOUNTER — APPOINTMENT (OUTPATIENT)
Dept: GENERAL RADIOLOGY | Age: 69
DRG: 189 | End: 2024-12-19
Payer: MEDICARE

## 2024-12-19 PROBLEM — R06.03 ACUTE RESPIRATORY DISTRESS: Status: ACTIVE | Noted: 2024-12-19

## 2024-12-19 LAB
ANION GAP SERPL CALCULATED.3IONS-SCNC: 10 MMOL/L (ref 9–16)
ANION GAP SERPL CALCULATED.3IONS-SCNC: NORMAL MMOL/L (ref 9–16)
B PARAP IS1001 DNA NPH QL NAA+NON-PROBE: NOT DETECTED
B PERT DNA SPEC QL NAA+PROBE: NOT DETECTED
BASOPHILS # BLD: 0.04 K/UL (ref 0–0.2)
BASOPHILS NFR BLD: 0 % (ref 0–2)
BUN SERPL-MCNC: 30 MG/DL (ref 8–23)
BUN SERPL-MCNC: NORMAL MG/DL (ref 8–23)
C PNEUM DNA NPH QL NAA+NON-PROBE: NOT DETECTED
CALCIUM SERPL-MCNC: 8.9 MG/DL (ref 8.6–10.4)
CALCIUM SERPL-MCNC: NORMAL MG/DL (ref 8.6–10.4)
CHLORIDE SERPL-SCNC: 96 MMOL/L (ref 98–107)
CHLORIDE SERPL-SCNC: NORMAL MMOL/L (ref 98–107)
CHP ED QC CHECK: YES
CO2 SERPL-SCNC: 32 MMOL/L (ref 20–31)
CO2 SERPL-SCNC: NORMAL MMOL/L (ref 20–31)
CREAT SERPL-MCNC: 1.2 MG/DL (ref 0.7–1.2)
CREAT SERPL-MCNC: NORMAL MG/DL (ref 0.7–1.2)
EOSINOPHIL # BLD: 0.27 K/UL (ref 0–0.44)
EOSINOPHILS RELATIVE PERCENT: 3 % (ref 1–4)
ERYTHROCYTE [DISTWIDTH] IN BLOOD BY AUTOMATED COUNT: 14.2 % (ref 11.8–14.4)
FLUAV RNA NPH QL NAA+NON-PROBE: NOT DETECTED
FLUBV RNA NPH QL NAA+NON-PROBE: NOT DETECTED
GFR, ESTIMATED: 65 ML/MIN/1.73M2
GFR, ESTIMATED: NORMAL ML/MIN/1.73M2
GLUCOSE BLD-MCNC: 171 MG/DL (ref 75–110)
GLUCOSE BLD-MCNC: 188 MG/DL (ref 75–110)
GLUCOSE BLD-MCNC: 207 MG/DL
GLUCOSE BLD-MCNC: 207 MG/DL (ref 75–110)
GLUCOSE BLD-MCNC: 345 MG/DL (ref 75–110)
GLUCOSE BLD-MCNC: 465 MG/DL (ref 75–110)
GLUCOSE BLD-MCNC: 496 MG/DL (ref 75–110)
GLUCOSE SERPL-MCNC: 184 MG/DL (ref 74–99)
GLUCOSE SERPL-MCNC: NORMAL MG/DL (ref 74–99)
HADV DNA NPH QL NAA+NON-PROBE: NOT DETECTED
HCOV 229E RNA NPH QL NAA+NON-PROBE: NOT DETECTED
HCOV HKU1 RNA NPH QL NAA+NON-PROBE: NOT DETECTED
HCOV NL63 RNA NPH QL NAA+NON-PROBE: NOT DETECTED
HCOV OC43 RNA NPH QL NAA+NON-PROBE: NOT DETECTED
HCT VFR BLD AUTO: 39 % (ref 40.7–50.3)
HGB BLD-MCNC: 12.4 G/DL (ref 13–17)
HMPV RNA NPH QL NAA+NON-PROBE: NOT DETECTED
HPIV1 RNA NPH QL NAA+NON-PROBE: NOT DETECTED
HPIV2 RNA NPH QL NAA+NON-PROBE: NOT DETECTED
HPIV3 RNA NPH QL NAA+NON-PROBE: NOT DETECTED
HPIV4 RNA NPH QL NAA+NON-PROBE: NOT DETECTED
IMM GRANULOCYTES # BLD AUTO: 0.16 K/UL (ref 0–0.3)
IMM GRANULOCYTES NFR BLD: 2 %
INR PPP: 1.2
L PNEUMO1 AG UR QL IA.RAPID: NEGATIVE
LYMPHOCYTES NFR BLD: 1.51 K/UL (ref 1.1–3.7)
LYMPHOCYTES RELATIVE PERCENT: 15 % (ref 24–43)
M PNEUMO DNA NPH QL NAA+NON-PROBE: NOT DETECTED
MCH RBC QN AUTO: 31 PG (ref 25.2–33.5)
MCHC RBC AUTO-ENTMCNC: 31.8 G/DL (ref 28.4–34.8)
MCV RBC AUTO: 97.5 FL (ref 82.6–102.9)
MONOCYTES NFR BLD: 0.93 K/UL (ref 0.1–1.2)
MONOCYTES NFR BLD: 9 % (ref 3–12)
NEUTROPHILS NFR BLD: 71 % (ref 36–65)
NEUTS SEG NFR BLD: 7.16 K/UL (ref 1.5–8.1)
NRBC BLD-RTO: 0 PER 100 WBC
PLATELET # BLD AUTO: 224 K/UL (ref 138–453)
PMV BLD AUTO: 11.4 FL (ref 8.1–13.5)
POTASSIUM SERPL-SCNC: 3.9 MMOL/L (ref 3.7–5.3)
POTASSIUM SERPL-SCNC: NORMAL MMOL/L (ref 3.7–5.3)
PROCALCITONIN SERPL-MCNC: 0.24 NG/ML (ref 0–0.09)
PROCALCITONIN SERPL-MCNC: NORMAL NG/ML (ref 0–0.09)
PROTHROMBIN TIME: 14.9 SEC (ref 11.7–14.9)
RBC # BLD AUTO: 4 M/UL (ref 4.21–5.77)
RSV RNA NPH QL NAA+NON-PROBE: NOT DETECTED
RV+EV RNA NPH QL NAA+NON-PROBE: NOT DETECTED
S PNEUM AG SPEC QL: NEGATIVE
SARS-COV-2 RNA NPH QL NAA+NON-PROBE: NOT DETECTED
SODIUM SERPL-SCNC: 138 MMOL/L (ref 136–145)
SODIUM SERPL-SCNC: NORMAL MMOL/L (ref 136–145)
SPECIMEN DESCRIPTION: NORMAL
SPECIMEN SOURCE: NORMAL
WBC OTHER # BLD: 10.1 K/UL (ref 3.5–11.3)

## 2024-12-19 PROCEDURE — 2T015 HOSPITALIST 2ND TOUCH: CPT | Performed by: STUDENT IN AN ORGANIZED HEALTH CARE EDUCATION/TRAINING PROGRAM

## 2024-12-19 PROCEDURE — 99223 1ST HOSP IP/OBS HIGH 75: CPT | Performed by: INTERNAL MEDICINE

## 2024-12-19 PROCEDURE — 94640 AIRWAY INHALATION TREATMENT: CPT

## 2024-12-19 PROCEDURE — 84145 PROCALCITONIN (PCT): CPT

## 2024-12-19 PROCEDURE — 36415 COLL VENOUS BLD VENIPUNCTURE: CPT

## 2024-12-19 PROCEDURE — 6360000002 HC RX W HCPCS: Performed by: STUDENT IN AN ORGANIZED HEALTH CARE EDUCATION/TRAINING PROGRAM

## 2024-12-19 PROCEDURE — 6370000000 HC RX 637 (ALT 250 FOR IP): Performed by: NURSE PRACTITIONER

## 2024-12-19 PROCEDURE — 82947 ASSAY GLUCOSE BLOOD QUANT: CPT

## 2024-12-19 PROCEDURE — 87899 AGENT NOS ASSAY W/OPTIC: CPT

## 2024-12-19 PROCEDURE — 97166 OT EVAL MOD COMPLEX 45 MIN: CPT

## 2024-12-19 PROCEDURE — 71250 CT THORAX DX C-: CPT

## 2024-12-19 PROCEDURE — 97530 THERAPEUTIC ACTIVITIES: CPT

## 2024-12-19 PROCEDURE — 2500000003 HC RX 250 WO HCPCS: Performed by: NURSE PRACTITIONER

## 2024-12-19 PROCEDURE — 85025 COMPLETE CBC W/AUTO DIFF WBC: CPT

## 2024-12-19 PROCEDURE — 6370000000 HC RX 637 (ALT 250 FOR IP): Performed by: STUDENT IN AN ORGANIZED HEALTH CARE EDUCATION/TRAINING PROGRAM

## 2024-12-19 PROCEDURE — 2500000003 HC RX 250 WO HCPCS: Performed by: STUDENT IN AN ORGANIZED HEALTH CARE EDUCATION/TRAINING PROGRAM

## 2024-12-19 PROCEDURE — 2580000003 HC RX 258: Performed by: NURSE PRACTITIONER

## 2024-12-19 PROCEDURE — 0202U NFCT DS 22 TRGT SARS-COV-2: CPT

## 2024-12-19 PROCEDURE — 2060000000 HC ICU INTERMEDIATE R&B

## 2024-12-19 PROCEDURE — 80048 BASIC METABOLIC PNL TOTAL CA: CPT

## 2024-12-19 PROCEDURE — 87449 NOS EACH ORGANISM AG IA: CPT

## 2024-12-19 PROCEDURE — 85610 PROTHROMBIN TIME: CPT

## 2024-12-19 PROCEDURE — 99223 1ST HOSP IP/OBS HIGH 75: CPT | Performed by: STUDENT IN AN ORGANIZED HEALTH CARE EDUCATION/TRAINING PROGRAM

## 2024-12-19 PROCEDURE — 94660 CPAP INITIATION&MGMT: CPT

## 2024-12-19 PROCEDURE — 97535 SELF CARE MNGMENT TRAINING: CPT

## 2024-12-19 PROCEDURE — 71045 X-RAY EXAM CHEST 1 VIEW: CPT

## 2024-12-19 RX ORDER — INSULIN GLARGINE 100 [IU]/ML
35 INJECTION, SOLUTION SUBCUTANEOUS NIGHTLY
Status: DISCONTINUED | OUTPATIENT
Start: 2024-12-19 | End: 2024-12-20

## 2024-12-19 RX ORDER — OXYCODONE AND ACETAMINOPHEN 5; 325 MG/1; MG/1
1 TABLET ORAL EVERY 4 HOURS PRN
Status: DISCONTINUED | OUTPATIENT
Start: 2024-12-19 | End: 2024-12-24 | Stop reason: HOSPADM

## 2024-12-19 RX ORDER — INSULIN LISPRO 100 [IU]/ML
10 INJECTION, SOLUTION INTRAVENOUS; SUBCUTANEOUS ONCE
Status: COMPLETED | OUTPATIENT
Start: 2024-12-19 | End: 2024-12-19

## 2024-12-19 RX ORDER — INSULIN LISPRO 100 [IU]/ML
0-16 INJECTION, SOLUTION INTRAVENOUS; SUBCUTANEOUS
Status: DISCONTINUED | OUTPATIENT
Start: 2024-12-19 | End: 2024-12-20

## 2024-12-19 RX ORDER — OXYCODONE AND ACETAMINOPHEN 5; 325 MG/1; MG/1
1 TABLET ORAL EVERY 6 HOURS PRN
Status: ON HOLD | COMMUNITY
End: 2024-12-21

## 2024-12-19 RX ORDER — INSULIN LISPRO 100 [IU]/ML
6 INJECTION, SOLUTION INTRAVENOUS; SUBCUTANEOUS ONCE
Status: COMPLETED | OUTPATIENT
Start: 2024-12-19 | End: 2024-12-19

## 2024-12-19 RX ORDER — OXYCODONE AND ACETAMINOPHEN 5; 325 MG/1; MG/1
2 TABLET ORAL EVERY 4 HOURS PRN
Status: DISCONTINUED | OUTPATIENT
Start: 2024-12-19 | End: 2024-12-24 | Stop reason: HOSPADM

## 2024-12-19 RX ORDER — METOPROLOL SUCCINATE 25 MG/1
25 TABLET, EXTENDED RELEASE ORAL DAILY
Status: DISCONTINUED | OUTPATIENT
Start: 2024-12-19 | End: 2024-12-24 | Stop reason: HOSPADM

## 2024-12-19 RX ORDER — INSULIN LISPRO 100 [IU]/ML
0-8 INJECTION, SOLUTION INTRAVENOUS; SUBCUTANEOUS
Status: DISCONTINUED | OUTPATIENT
Start: 2024-12-19 | End: 2024-12-19

## 2024-12-19 RX ORDER — BUDESONIDE AND FORMOTEROL FUMARATE DIHYDRATE 80; 4.5 UG/1; UG/1
2 AEROSOL RESPIRATORY (INHALATION)
Status: DISCONTINUED | OUTPATIENT
Start: 2024-12-19 | End: 2024-12-24 | Stop reason: HOSPADM

## 2024-12-19 RX ORDER — IPRATROPIUM BROMIDE AND ALBUTEROL SULFATE 2.5; .5 MG/3ML; MG/3ML
1 SOLUTION RESPIRATORY (INHALATION)
Status: DISCONTINUED | OUTPATIENT
Start: 2024-12-19 | End: 2024-12-24 | Stop reason: HOSPADM

## 2024-12-19 RX ORDER — ALPRAZOLAM 0.5 MG
0.5 TABLET ORAL NIGHTLY PRN
Status: DISCONTINUED | OUTPATIENT
Start: 2024-12-19 | End: 2024-12-20

## 2024-12-19 RX ORDER — INSULIN GLARGINE 100 [IU]/ML
45 INJECTION, SOLUTION SUBCUTANEOUS NIGHTLY
COMMUNITY

## 2024-12-19 RX ADMIN — IPRATROPIUM BROMIDE AND ALBUTEROL SULFATE 1 DOSE: 2.5; .5 SOLUTION RESPIRATORY (INHALATION) at 08:51

## 2024-12-19 RX ADMIN — INSULIN LISPRO 1 UNITS: 100 INJECTION, SOLUTION INTRAVENOUS; SUBCUTANEOUS at 00:52

## 2024-12-19 RX ADMIN — TIOTROPIUM BROMIDE INHALATION SPRAY 2 PUFF: 3.12 SPRAY, METERED RESPIRATORY (INHALATION) at 08:50

## 2024-12-19 RX ADMIN — INSULIN LISPRO 10 UNITS: 100 INJECTION, SOLUTION INTRAVENOUS; SUBCUTANEOUS at 23:22

## 2024-12-19 RX ADMIN — INSULIN GLARGINE 35 UNITS: 100 INJECTION, SOLUTION SUBCUTANEOUS at 23:00

## 2024-12-19 RX ADMIN — METHYLPREDNISOLONE SODIUM SUCCINATE 40 MG: 40 INJECTION INTRAMUSCULAR; INTRAVENOUS at 16:36

## 2024-12-19 RX ADMIN — IPRATROPIUM BROMIDE AND ALBUTEROL SULFATE 1 DOSE: 2.5; .5 SOLUTION RESPIRATORY (INHALATION) at 15:48

## 2024-12-19 RX ADMIN — TAMSULOSIN HYDROCHLORIDE 0.4 MG: 0.4 CAPSULE ORAL at 13:00

## 2024-12-19 RX ADMIN — SODIUM CHLORIDE, PRESERVATIVE FREE 5 ML: 5 INJECTION INTRAVENOUS at 00:41

## 2024-12-19 RX ADMIN — METHYLPREDNISOLONE SODIUM SUCCINATE 40 MG: 40 INJECTION INTRAMUSCULAR; INTRAVENOUS at 20:36

## 2024-12-19 RX ADMIN — ASPIRIN 81 MG: 81 TABLET, COATED ORAL at 13:00

## 2024-12-19 RX ADMIN — SODIUM CHLORIDE: 9 INJECTION, SOLUTION INTRAVENOUS at 00:54

## 2024-12-19 RX ADMIN — METOPROLOL SUCCINATE 25 MG: 25 TABLET, EXTENDED RELEASE ORAL at 13:00

## 2024-12-19 RX ADMIN — INSULIN LISPRO 6 UNITS: 100 INJECTION, SOLUTION INTRAVENOUS; SUBCUTANEOUS at 20:59

## 2024-12-19 RX ADMIN — ALPRAZOLAM 0.5 MG: 0.5 TABLET ORAL at 20:19

## 2024-12-19 RX ADMIN — IPRATROPIUM BROMIDE AND ALBUTEROL SULFATE 1 DOSE: 2.5; .5 SOLUTION RESPIRATORY (INHALATION) at 21:25

## 2024-12-19 RX ADMIN — ROFLUMILAST 500 MCG: 500 TABLET ORAL at 18:28

## 2024-12-19 RX ADMIN — SODIUM CHLORIDE, PRESERVATIVE FREE 10 ML: 5 INJECTION INTRAVENOUS at 20:19

## 2024-12-19 RX ADMIN — APIXABAN 5 MG: 5 TABLET, FILM COATED ORAL at 20:22

## 2024-12-19 RX ADMIN — INSULIN LISPRO 3 UNITS: 100 INJECTION, SOLUTION INTRAVENOUS; SUBCUTANEOUS at 18:28

## 2024-12-19 RX ADMIN — METHYLPREDNISOLONE SODIUM SUCCINATE 40 MG: 40 INJECTION INTRAMUSCULAR; INTRAVENOUS at 09:00

## 2024-12-19 RX ADMIN — INSULIN LISPRO 4 UNITS: 100 INJECTION, SOLUTION INTRAVENOUS; SUBCUTANEOUS at 20:36

## 2024-12-19 RX ADMIN — ATORVASTATIN CALCIUM 20 MG: 20 TABLET, FILM COATED ORAL at 13:00

## 2024-12-19 RX ADMIN — OXYCODONE HYDROCHLORIDE AND ACETAMINOPHEN 2 TABLET: 5; 325 TABLET ORAL at 20:19

## 2024-12-19 ASSESSMENT — PAIN SCALES - GENERAL
PAINLEVEL_OUTOF10: 8
PAINLEVEL_OUTOF10: 0

## 2024-12-19 ASSESSMENT — PAIN DESCRIPTION - LOCATION: LOCATION: HIP

## 2024-12-19 ASSESSMENT — PAIN DESCRIPTION - ORIENTATION: ORIENTATION: RIGHT;LEFT

## 2024-12-19 ASSESSMENT — PAIN SCALES - WONG BAKER: WONGBAKER_NUMERICALRESPONSE: HURTS A LITTLE BIT

## 2024-12-19 NOTE — CONSULTS
Pulmonary/Critical Care consultation    Patient's name:  Salvador Hatch  Medical Record Number: [id number]  Patient's account/billing number: [id number]  Patient's YOB: 1955  Age: 69 y.o.  Date of Admission: 12/18/2024  5:37 PM  Date of Consult: 12/19/2024      Primary Care Physician: No primary care provider on file.      Code Status: DNR-CCA    Reason for consult: COPD exacerbation      HISTORY OF PRESENT ILLNESS:   History was obtained from chart review and the patient.  Salvador Hatch is a 69 y.o. with past medical history of COPD/emphysema-s/p bronchoscopy lung volume reduction 6/22, chronic respiratory failure on 4 to 5 L of oxygen at home, on chronic prednisone 20 mg daily and azithromycin- 3 times a week due to advanced emphysema in the setting of lung transplant refusal, paroxysmal A-fib, CAD-s/p stenting RCA 2013, urinary retention with Boston, recent admission at [facility] for COPD exacerbation and UTI presented with difficulty in breathing.  Patient had a Boston catheter removed on Monday for a voiding trial and was having difficulty urinating himself along with SOB, Ems was called he was initially he was placed on CPAP and initial sats in 80s on arrival to ER patient was speaking only 1-2 word sentences and was switched to BiPAP with improvement in respiratory status, was also found to have 2+ bilateral lower extremity edema, denied chest pain, nausea, vomiting, EKG showed right axis deviation  Urinary retention with bladder scan showing 700 mL  On BiPAP 40% FiO2, otherwise hemodynamically stable  VBG-7.4/42/55/28  DAVID with creatinine 1.4-1.7  Chest x-ray-soft tissue fullness in left hilum and AP window with faint curvilinear opacities-recommended CT chest with contrast    Past Medical History:        Diagnosis Date    Atrial flutter (HCC)     CAD (coronary artery disease)     COPD (chronic obstructive pulmonary disease) (HCC)      approximately 4 hours earlier. This could be due to increasing atelectasis or pneumonia. 3. Possible trace pleural fluid layering posteriorly.     XR CHEST PORTABLE    Result Date: 12/18/2024  Mild cardiomegaly and mild central pulmonary congestion. Chronic obstructive lung changes with mild bibasilar atelectasis vs early infiltrates or scarring     XR CHEST 1 VIEW    Result Date: 11/25/2024  Clinical Information: Shortness of breath. Comparison: 11/21/24. * Vascular congestion with basilar airspace opacifications noted. No effusions. Cardiomegaly with prominent pulmonary arteries. Electronically signed: Carson Taylor MD.    XR CHEST 1 VIEW    Result Date: 11/21/2024  Unchanged from [place] with no new acute pulmonary abnormality is detected Electronically signed: Humberto Araya.       XR CHEST PORTABLE    Result Date: 12/19/2024  1. Soft tissue fullness in the region of the left hilum and AP window with faint curvilinear radiopacities in the left hilar region of uncertain etiology and significance.  The soft tissue fullness may be due to adenopathy or mass. Recommend follow-up CT chest with contrast. 2. Increasing bibasilar opacity compared with the study performed approximately 4 hours earlier. This could be due to increasing atelectasis or pneumonia. 3. Possible trace pleural fluid layering posteriorly.     XR CHEST PORTABLE    Result Date: 12/18/2024  Mild cardiomegaly and mild central pulmonary congestion. Chronic obstructive lung changes with mild bibasilar atelectasis vs early infiltrates or scarring     XR CHEST 1 VIEW    Result Date: 11/25/2024  Clinical Information: Shortness of breath. Comparison: 11/21/24. * Vascular congestion with basilar airspace opacifications noted. No effusions. Cardiomegaly with prominent pulmonary arteries. Electronically signed: Carson Taylor MD.    XR CHEST 1 VIEW    Result Date: 11/21/2024  Unchanged from [place] with no new acute pulmonary abnormality is detected

## 2024-12-19 NOTE — ED NOTES
ED to inpatient nurses report      Chief Complaint:  Chief Complaint   Patient presents with    Shortness of Breath     Present to ED from: home    MOA:     LOC: alert and orientated to name, place, date  Mobility: Requires assistance * 1  Oxygen Baseline: RA    Current needs required: bipap   Pending ED orders: none  Present condition: stable    Why did the patient come to the ED?     Pt arrived to ED with report of difficulty breathing.   Per report patient has been SOB all day and placed on bipap PTA,   Pt reports hx of emphysema and has had his left lung removed  Pt states he was at [facility] and had his bee catheter removed to trial urinating without but states he was unable and needs catheter replaced.  Pt A&Ox4, call light within reach, pt attached to full cardiac monitor and RT @ bedside on arrival.   Pt has dexacom glucose monitor noted to right upper arm         What is the plan? admit  Any procedures or intervention occur? Labs, bladder scan, bee placement   Any safety concerns??    Mental Status:       Psych Assessment:   Psychosocial  Psychosocial (WDL): Within Defined Limits  Vital signs   Vitals:    12/18/24 2006 12/18/24 2025 12/18/24 2041 12/18/24 2114   BP:  105/70  111/72   Pulse: 84 75 76 73   Resp:       Temp:       TempSrc:       SpO2: 100% 100% 100% 100%   Weight:            Vitals:  Patient Vitals for the past 24 hrs:   BP Temp Temp src Pulse Resp SpO2 Weight   12/18/24 2114 111/72 -- -- 73 -- 100 % --   12/18/24 2041 -- -- -- 76 -- 100 % --   12/18/24 2025 105/70 -- -- 75 -- 100 % --   12/18/24 2006 -- -- -- 84 -- 100 % --   12/18/24 1945 110/73 -- -- 75 -- 100 % --   12/18/24 1929 -- -- -- 74 -- 100 % --   12/18/24 1853 -- -- -- 75 -- 100 % --   12/18/24 1851 -- -- -- -- -- -- 102.1 kg (225 lb)   12/18/24 1845 114/68 -- -- 79 -- 100 % --   12/18/24 1753 90/63 -- -- 82 -- 100 % --   12/18/24 1745 -- -- -- 85 17 99 % --   12/18/24 1741 -- 98.6 °F (37 °C) Oral 94 -- 98 % --      Visit  Risk  (12/2/2024)    [facility]    Housing Instability     Are you worried or concerned that in the next two months you may not have stable housing that you own, rent or stay in as a part of a household?: No       FAMILY HISTORY     History reviewed. No pertinent family history.    ALLERGIES     Patient has no known allergies.    CURRENT MEDICATIONS       Previous Medications    ALBUTEROL (PROVENTIL) (2.5 MG/3ML) 0.083% NEBULIZER SOLUTION    Take 2.5 mg by nebulization 4 times daily    ALBUTEROL SULFATE  (90 BASE) MCG/ACT INHALER    Inhale 2 puffs into the lungs every 4 hours    ALPRAZOLAM (XANAX) 0.5 MG TABLET    Take 0.5 mg by mouth nightly    APIXABAN (ELIQUIS) 5 MG TABS TABLET    Take 5 mg by mouth daily    ASPIRIN 81 MG TABLET    Take 81 mg by mouth daily    BUPROPION (WELLBUTRIN XL) 150 MG EXTENDED RELEASE TABLET    Take 150 mg by mouth daily    DILTIAZEM (CARDIZEM 12 HR) 120 MG EXTENDED RELEASE CAPSULE    Take 120 mg by mouth daily    FLUTICASONE-SALMETEROL (ADVAIR) 500-50 MCG/DOSE DISKUS INHALER    Inhale 1 puff into the lungs every 12 hours    GABAPENTIN (NEURONTIN) 100 MG CAPSULE    Take 100 mg by mouth daily Take 2 tablets daily    LISINOPRIL (PRINIVIL;ZESTRIL) 10 MG TABLET    Take 10 mg by mouth daily    METOPROLOL SUCCINATE (TOPROL XL) 25 MG EXTENDED RELEASE TABLET    Take 25 mg by mouth daily    OXYGEN    Inhale 4 L/min into the lungs continuous    ROFLUMILAST (DALIRESP) 500 MCG TABLET    Take 500 mcg by mouth daily    SIMVASTATIN (ZOCOR) 40 MG TABLET    Take 40 mg by mouth daily    TAMSULOSIN (FLOMAX) 0.4 MG CAPSULE    Take 0.4 mg by mouth daily    TIOTROPIUM (SPIRIVA) 18 MCG INHALATION CAPSULE    Inhale 18 mcg into the lungs daily     Orders Placed This Encounter   Medications    magnesium sulfate 2000 mg in 50 mL IVPB premix       SURGICAL HISTORY     History reviewed. No pertinent surgical history.    PAST MEDICAL HISTORY       Past Medical History:   Diagnosis

## 2024-12-19 NOTE — PROGRESS NOTES
Patient came up with bee that is being managed by [facility]. However, they pulled the old one and were unable to place a new one at that time. New bee was placed in the ED. Writer reached out to NP for urology consult. NP responds to leave a note and that they can do that during rounds in the AM.

## 2024-12-19 NOTE — PROGRESS NOTES
[facility]  Office: [phone]  Ha Ledezma DO, Marbin Josue DO, Charles Dueñas DO, Nabor Doyle DO, Kristi Cabral MD, Kena Purvis MD, Rohini Vasquez MD, Marija Hagan MD,  Latrell De La Cruz MD, Chaz Lynch MD, Sandra Bernal MD,  Tommy Rees DO, Elder Grace MD, Arnold Sabillon MD, Shyam Ledezma DO, Vale Billings MD,  Easton Norris DO, Laura Cotto MD, Aida Thompson MD, Joyce Walker MD, Nolvia Will MD,  Jimbo Perdue MD, Beau Doran MD, Santiago Carnes MD, Kevin Mccormick MD, Christ Beavers MD, Sabino Carrero MD, Geoffrey Norton DO, Sinan Mchugh MD, Shirley Waterhouse, CNP,  Sara Koroma CNP, Geoffrey Valdes, BARBARA,  Angy Jensen, KRISTOPHER, Allie Barcenas, CNP, Suzy Hickey, CNP, Mitra Mccray, CNP, Bebe Mendoza, CNP, Alma Galindo PA-C, Anabelle Khanna PA-C, Navya Hanna, BARBARA, Pao Ritchie, CNP,  Tosha Liang, CNP, Lesli Mirza, CNP,  Bonnie Munoz, CNP, Rachelle Bear, CNP         [facility]   IN-PATIENT SERVICE   [facility]    Progress Note    12/19/2024    8:18 AM    Name:   Salvador Hatch  MRN:     [id number]     Acct:      [id number]   Room:   0406/0406-01   Day:  1  Admit Date:  12/18/2024  5:37 PM    PCP:   No primary care provider on file.  Code Status:  DNR-CCA    Subjective:     C/C:   Chief Complaint   Patient presents with    Shortness of Breath     Interval History Status: not changed.     Patient seen and examined.  Patient was on BiPAP in the morning.  He feels much better and shortness of breath has been improving.  No chest pain.  Boston catheter was placed in the ER.  Labs and vitals reviewed.    Brief History:     69-year-old male with past medical history of COPD, emphysema, history of bronchoscopic lung volume reduction in 2022, chronic respiratory failure on home 4 to 5 L of oxygen, chronic prednisone, paroxysmal A-fib on Eliquis, CAD with stent, osteoarthritis, urinary retention with recent      XR CHEST PORTABLE    Result Date: 12/18/2024  Mild cardiomegaly and mild central pulmonary congestion. Chronic obstructive lung changes with mild bibasilar atelectasis vs early infiltrates or scarring       Physical Examination:        General appearance:  alert, cooperative and no distress  Mental Status:  oriented to person, place and time and normal affect  Lungs:  clear to auscultation bilaterally, on BiPAP, prolonged expiration  Heart:  regular rate and rhythm, no murmur  Abdomen:  soft, nontender, nondistended, normal bowel sounds, no masses, hepatomegaly, splenomegaly  Extremities:  no edema, redness, tenderness in the calves  Skin:  no gross lesions, rashes, induration    Assessment:        Hospital Problems             Last Modified POA    * (Principal) COPD with acute exacerbation ([facility]) 12/18/2024 Yes    Atrial flutter ([facility]) 12/18/2024 Yes    CAD (coronary artery disease) 12/18/2024 Yes    COPD (chronic obstructive pulmonary disease) ([facility]) 12/18/2024 Yes    Osteoarthritis 12/18/2024 Yes    DAVID (acute kidney injury) ([facility]) 12/18/2024 Yes    Acute on chronic hypoxic respiratory failure 12/18/2024 Yes    Pulmonary congestion 12/18/2024 Yes    BPH (benign prostatic hyperplasia) 12/18/2024 Yes    Urinary retention 12/18/2024 Yes    COPD exacerbation ([facility]) 12/18/2024 Yes       Plan:        Acute on chronic hypoxic respiratory failure with COPD exacerbation-chest x-ray shows potassium fullness, will obtain CT chest for better evaluation, pulmonary consulted, continue breathing treatments, Solu-Medrol IV.  Negative viral panel.  Weaned from BiPAP to nasal cannula oxygen    Diastolic heart failure-holding Lasix due to DAVID    Atrial flutter on Eliquis    CAD with history of stent    Urinary retention-secondary to BPH, Boston catheter placed, urology consulted, continue Flomax    Elder Grace MD  12/19/2024  8:18 AM

## 2024-12-19 NOTE — ED NOTES
The following labs labeled with pt sticker and tubed to lab:     [] Blue     [] Lavender   [] on ice  [] Green/yellow  [x] Green/black [x] on ice  [] Yellow  [] Red  [] Pink      [] COVID-19 swab    [] Rapid  [] PCR  [] Flu Swab  [] Strep Swab  [] Peds Viral Panel     [] Urine Sample  [] Pelvic Cultures  [] Blood Cultures   [] Wound Cultures

## 2024-12-19 NOTE — ED PROVIDER NOTES
Faculty Sign-Out Attestation  Handoff taken on the following patient from prior Attending Physician: Danielle  Note Started: 11:01 PM EST    I was available and discussed any additional care issues that arose and coordinated the management plans with the resident(s) caring for the patient during my duty period. Any areas of disagreement with resident’s documentation of care or procedures are noted on the chart. I was personally present for the key portions of any/all procedures during my duty period. I have documented in the chart those procedures where I was not present during the key portions.    COPD, bipap, >> pending admit    Tin Amos DO  Attending Physician      Tin Amos DO  12/18/24 9027

## 2024-12-19 NOTE — H&P
peripheral pulses palpable, no pedal edema or calf pain with palpation  Psych: normal affect    Investigations:      Laboratory Testing:  Recent Results (from the past 24 hour(s))   CBC with Auto Differential    Collection Time: 12/18/24  7:53 PM   Result Value Ref Range    WBC 9.7 3.5 - 11.3 k/uL    RBC 4.35 4.21 - 5.77 m/uL    Hemoglobin 13.4 13.0 - 17.0 g/dL    Hematocrit 42.7 40.7 - 50.3 %    MCV 98.2 82.6 - 102.9 fL    MCH 30.8 25.2 - 33.5 pg    MCHC 31.4 28.4 - 34.8 g/dL    RDW 14.1 11.8 - 14.4 %    Platelets 204 138 - 453 k/uL    MPV 11.8 8.1 - 13.5 fL    NRBC Automated 0.0 0.0 per 100 WBC    Neutrophils % 76 (H) 36 - 65 %    Lymphocytes % 16 (L) 24 - 43 %    Monocytes % 6 3 - 12 %    Eosinophils % 0 (L) 1 - 4 %    Basophils % 0 0 - 2 %    Immature Granulocytes % 2 (H) 0 %    Neutrophils Absolute 7.36 1.50 - 8.10 k/uL    Lymphocytes Absolute 1.50 1.10 - 3.70 k/uL    Monocytes Absolute 0.60 0.10 - 1.20 k/uL    Eosinophils Absolute <0.03 0.00 - 0.44 k/uL    Basophils Absolute <0.03 0.00 - 0.20 k/uL    Immature Granulocytes Absolute 0.16 0.00 - 0.30 k/uL   Comprehensive Metabolic Panel    Collection Time: 12/18/24  7:53 PM   Result Value Ref Range    Sodium 134 (L) 136 - 145 mmol/L    Potassium 5.1 3.7 - 5.3 mmol/L    Chloride 90 (L) 98 - 107 mmol/L    CO2 28 20 - 31 mmol/L    Anion Gap 16 9 - 16 mmol/L    Glucose 327 (H) 74 - 99 mg/dL    BUN 42 (H) 8 - 23 mg/dL    Creatinine 1.7 (H) 0.7 - 1.2 mg/dL    Est, Glom Filt Rate 43 (L) >60 mL/min/1.73m2    Calcium 9.0 8.6 - 10.4 mg/dL    Total Protein 7.1 6.6 - 8.7 g/dL    Albumin 3.5 3.5 - 5.2 g/dL    Albumin/Globulin Ratio 1.0 1.0 - 2.5    Total Bilirubin 0.3 0.0 - 1.2 mg/dL    Alkaline Phosphatase 118 40 - 129 U/L    ALT 25 10 - 50 U/L    AST 26 10 - 50 U/L   Troponin    Collection Time: 12/18/24  7:53 PM   Result Value Ref Range    Troponin, High Sensitivity 35 (H) 0 - 22 ng/L   Brain Natriuretic Peptide    Collection Time: 12/18/24  7:53 PM   Result Value Ref  PORTABLE    Result Date: 12/18/2024  Mild cardiomegaly and mild central pulmonary congestion. Chronic obstructive lung changes with mild bibasilar atelectasis vs early infiltrates or scarring       Assessment :      Hospital Problems             Last Modified POA    * (Principal) COPD with acute exacerbation ([facility]) 12/18/2024 Yes    Atrial flutter ([facility]) 12/18/2024 Yes    CAD (coronary artery disease) 12/18/2024 Yes    COPD (chronic obstructive pulmonary disease) ([facility]) 12/18/2024 Yes    Osteoarthritis 12/18/2024 Yes    DAVID (acute kidney injury) ([facility]) 12/18/2024 Yes    Acute on chronic hypoxic respiratory failure 12/18/2024 Yes    Pulmonary congestion 12/18/2024 Yes    BPH (benign prostatic hyperplasia) 12/18/2024 Yes    Urinary retention 12/18/2024 Yes    COPD exacerbation ([facility]) 12/18/2024 Yes       Plan:     Patient status inpatient in the Med/Surge    Acute on chronic hypoxic respiratory failure due to COPD exacerbation versus CHF exacerbation: Chest x-ray showed pulmonary vascular congestion and chronic obstructive changes.  Placing on scheduled DuoNebs.  IV steroids.  Supplemental oxygen/BiPAP.   Strict I's and O's and daily weight.  Chest x-ray is showing soft tissue fullness in the region of left hilum and AP window with  curvilinear radiopacities in the left hilar region of uncertain significance.  Increasing bilateral opacities.  Ordering respiratory culture, viral respiratory panel, strep and Legionella  Patient BNP is 120.  Consulting pulmonologist  Holding off on antibiotics for now.  Follow-up with creatinine in the morning if improving can give Lasix.  Patient had echo done recently which showed normal EF.    Atrial flutter on Eliquis: Resuming Eliquis  CAD history of stent: Resuming aspirin, atorvastatin  DAVID: It could be postobstructive due to BPH and urinary retention.  Boston catheter in place.  Follow-up with BMP in the morning.  Patient slightly volume overloaded.  Once creatinine is slightly

## 2024-12-19 NOTE — PLAN OF CARE
Problem: Discharge Planning  Goal: Discharge to home or other facility with appropriate resources  12/19/2024 1647 by Sanam Sanchez RN  Outcome: Progressing  Flowsheets (Taken 12/19/2024 0800)  Discharge to home or other facility with appropriate resources: Identify barriers to discharge with patient and caregiver  12/19/2024 0433 by Clover Calabrese RN  Outcome: Progressing     Problem: ABCDS Injury Assessment  Goal: Absence of physical injury  12/19/2024 1647 by Sanam Sanchez RN  Outcome: Progressing  12/19/2024 0433 by Clover Calabrese RN  Outcome: Progressing     Problem: Safety - Adult  Goal: Free from fall injury  12/19/2024 1647 by Sanam Sanchez RN  Outcome: Progressing  12/19/2024 0433 by Clover Calabrese RN  Outcome: Progressing     Problem: Neurosensory - Adult  Goal: Achieves stable or improved neurological status  Outcome: Progressing     Problem: Respiratory - Adult  Goal: Achieves optimal ventilation and oxygenation  Outcome: Progressing     Problem: Cardiovascular - Adult  Goal: Maintains optimal cardiac output and hemodynamic stability  Outcome: Progressing     Problem: Skin/Tissue Integrity - Adult  Goal: Skin integrity remains intact  Outcome: Progressing  Flowsheets (Taken 12/19/2024 1643)  Skin Integrity Remains Intact: Monitor for areas of redness and/or skin breakdown     Problem: Musculoskeletal - Adult  Goal: Return mobility to safest level of function  Outcome: Progressing     Problem: Infection - Adult  Goal: Absence of infection at discharge  Outcome: Progressing     Problem: Metabolic/Fluid and Electrolytes - Adult  Goal: Electrolytes maintained within normal limits  Outcome: Progressing     Problem: Hematologic - Adult  Goal: Maintains hematologic stability  Outcome: Progressing     Problem: Skin/Tissue Integrity  Goal: Absence of new skin breakdown  Description: 1.  Monitor for areas of redness and/or skin breakdown  2.  Assess vascular access sites hourly  3.  Every 4-6 hours  minimum:  Change oxygen saturation probe site  4.  Every 4-6 hours:  If on nasal continuous positive airway pressure, respiratory therapy assess nares and determine need for appliance change or resting period.  Outcome: Progressing

## 2024-12-19 NOTE — CONSULTS
Justo Wright MD [facility]    Urology Consultation    Patient:  Salvador Hatch  MRN: [id number]  YOB: 1955    CHIEF COMPLAINT: Retention    HISTORY OF PRESENT ILLNESS:   The patient is a 69 y.o. male who presents with shortness of breath.  Urology was consulted for urinary retention.  Patient has extensive history of urinary retention requiring multiple Boston catheters in the past.  This past Monday patient presented to an outside hospital at [facility] and had a void trial due to urinary tension and previous hospital admission.  Patient was unable to urinate.  Patient unfortunately had some shortness of breath and respiratory issues and was directed to go to the emergency room.  In the emergency room they placed a Boston catheter for 700 cc.    Patient is seen Dr. David in the past for urinary problems as well as prostatitis, hematuria.  He was previously on Flomax and finasteride.  However the possible results worsen.  He reports significant LUTS.  Boston catheter currently in place draining well.    WBC 10.1  Creatinine 1.2    Patient's old records, notes and chart reviewed and summarized above.    Past Medical History:    Past Medical History:   Diagnosis Date    Atrial flutter (HCC)     CAD (coronary artery disease)     COPD (chronic obstructive pulmonary disease) (HCC)     Osteoarthritis        Past Surgical History:    History reviewed. No pertinent surgical history.  Previous  surgery: none     Medications:    Scheduled Meds:   methylPREDNISolone  40 mg IntraVENous Q8H    ipratropium 0.5 mg-albuterol 2.5 mg  1 Dose Inhalation Q4H While awake    metoprolol succinate  25 mg Oral Daily    apixaban  5 mg Oral BID    influenza virus vaccine  1 Dose IntraMUSCular Prior to discharge    budesonide-formoterol  2 puff Inhalation BID RT    sodium chloride flush  5-40 mL IntraVENous 2 times per day    aspirin  81 mg Oral Daily    Roflumilast  500 mcg Oral Daily     Panel [NHANES]     Frequency of Communication with Friends and Family: More than three times a week     Frequency of Social Gatherings with Friends and Family: More than three times a week     Attends Mu-ism Services: Never     Active Member of Clubs or Organizations: No     Attends Club or Organization Meetings: Never     Marital Status:    Intimate Partner Violence: Not At Risk (9/19/2024)    Received from [facility]    Humiliation, Afraid, Rape, and Kick questionnaire     Fear of Current or Ex-Partner: No     Emotionally Abused: No     Physically Abused: No     Sexually Abused: No   Housing Stability: Low Risk  (12/19/2024)    Housing Stability Vital Sign     Unable to Pay for Housing in the Last Year: No     Number of Times Moved in the Last Year: 1     Homeless in the Last Year: No       Family History:  History reviewed. No pertinent family history.  Previous Urologic Family history: none    REVIEW OF SYSTEMS:  Constitutional: negative  Eyes: negative  Respiratory: negative  Cardiovascular: negative  Gastrointestinal: negative  Genitourinary: see HPI  Musculoskeletal: negative  Skin: negative   Neurological: negative  Hematological/Lymphatic: negative  Psychological: negative    Physical Exam:    This a 69 y.o. male   Patient Vitals for the past 24 hrs:   BP Temp Temp src Pulse Resp SpO2 Height Weight   12/19/24 0900 126/89 98.6 °F (37 °C) Oral 80 26 100 % -- --   12/19/24 0402 -- -- -- 78 16 99 % -- --   12/19/24 0347 114/65 98.2 °F (36.8 °C) Axillary 82 21 100 % 1.803 m (5' 11\") 101.4 kg (223 lb 8.7 oz)   12/19/24 0300 -- -- -- 87 -- 97 % -- --   12/19/24 0243 -- -- -- 85 -- 100 % -- --   12/19/24 0145 107/75 -- -- 76 -- 100 % -- --   12/19/24 0126 107/74 -- -- 76 -- 100 % -- --   12/19/24 0046 107/81 -- -- 78 -- 100 % -- --   12/19/24 0000 115/75 -- -- 76 -- 99 % -- --   12/18/24 2345 114/79 -- -- 73 -- 100 % -- --   12/18/24 2330 114/75 -- -- 74 -- 98 % -- --   12/18/24 2240 117/74

## 2024-12-19 NOTE — PROGRESS NOTES
Occupational Therapy  Occupational Therapy Initial Evaluation  Facility/Department: [facility] 4A STEPDOWN   Patient Name: Salvador Hatch        MRN: [id number]    : 1955    Date of Service: 2024    Chief Complaint   Patient presents with    Shortness of Breath     Past Medical History:  has a past medical history of Atrial flutter (HCC), CAD (coronary artery disease), COPD (chronic obstructive pulmonary disease) (HCC), and Osteoarthritis.  Past Surgical History:  has no past surgical history on file.    Discharge Recommendations  Discharge Recommendations: Patient would benefit from continued therapy after discharge  OT Equipment Recommendations  Equipment Needed: Yes  Mobility Devices: Walker  Walker: Rolling    Assessment  Performance deficits / Impairments: Decreased functional mobility ;Decreased ADL status;Decreased safe awareness;Decreased endurance;Decreased balance;Decreased high-level IADLs;Decreased strength  Assessment: Pt limited this date by SOB, fatigue, and decreased endurance. Pt completed bed mobility and functional transfers with Min A. Pt on 5L O2 NC throughout session reporting increased SOB after standing requiring bipap to be place upon return to supine. Pt currently requiring assistance for all ADL completion, therefore would be unsafe to return to prior living arrangements. Pt would continue to benefit from OT services to address deficits listed above and improve overall functional performance prior to discharge.  Prognosis: Good  Decision Making: Medium Complexity  REQUIRES OT FOLLOW-UP: Yes  Activity Tolerance  Activity Tolerance: Patient limited by fatigue  Safety Devices  Type of Devices: Bed alarm in place;Call light within reach;Gait belt;Left in bed;Nurse notified;Patient at risk for falls  Restraints  Restraints Initially in Place: No    AM-PAC  AM-PAC Daily Activity - Inpatient   How much help is needed for putting on and taking off regular lower body clothing?: A Lot  How  OTR/L on 12/19/24 at 3:46 PM EST

## 2024-12-20 LAB
ANION GAP SERPL CALCULATED.3IONS-SCNC: 16 MMOL/L (ref 9–16)
BUN SERPL-MCNC: 32 MG/DL (ref 8–23)
CALCIUM SERPL-MCNC: 9.5 MG/DL (ref 8.6–10.4)
CHLORIDE SERPL-SCNC: 94 MMOL/L (ref 98–107)
CO2 SERPL-SCNC: 26 MMOL/L (ref 20–31)
CREAT SERPL-MCNC: 1.4 MG/DL (ref 0.7–1.2)
EST. AVERAGE GLUCOSE BLD GHB EST-MCNC: 266 MG/DL
GFR, ESTIMATED: 54 ML/MIN/1.73M2
GLUCOSE BLD-MCNC: 277 MG/DL (ref 75–110)
GLUCOSE BLD-MCNC: 278 MG/DL (ref 75–110)
GLUCOSE BLD-MCNC: 299 MG/DL (ref 75–110)
GLUCOSE BLD-MCNC: 317 MG/DL (ref 75–110)
GLUCOSE BLD-MCNC: 357 MG/DL (ref 75–110)
GLUCOSE BLD-MCNC: 405 MG/DL (ref 75–110)
GLUCOSE SERPL-MCNC: 291 MG/DL (ref 74–99)
HBA1C MFR BLD: 10.9 % (ref 4–6)
POTASSIUM SERPL-SCNC: 4.7 MMOL/L (ref 3.7–5.3)
SODIUM SERPL-SCNC: 136 MMOL/L (ref 136–145)

## 2024-12-20 PROCEDURE — 99232 SBSQ HOSP IP/OBS MODERATE 35: CPT | Performed by: INTERNAL MEDICINE

## 2024-12-20 PROCEDURE — 2700000000 HC OXYGEN THERAPY PER DAY

## 2024-12-20 PROCEDURE — 94761 N-INVAS EAR/PLS OXIMETRY MLT: CPT

## 2024-12-20 PROCEDURE — 6370000000 HC RX 637 (ALT 250 FOR IP): Performed by: INTERNAL MEDICINE

## 2024-12-20 PROCEDURE — 36415 COLL VENOUS BLD VENIPUNCTURE: CPT

## 2024-12-20 PROCEDURE — 99232 SBSQ HOSP IP/OBS MODERATE 35: CPT | Performed by: STUDENT IN AN ORGANIZED HEALTH CARE EDUCATION/TRAINING PROGRAM

## 2024-12-20 PROCEDURE — 6370000000 HC RX 637 (ALT 250 FOR IP)

## 2024-12-20 PROCEDURE — 6370000000 HC RX 637 (ALT 250 FOR IP): Performed by: STUDENT IN AN ORGANIZED HEALTH CARE EDUCATION/TRAINING PROGRAM

## 2024-12-20 PROCEDURE — 94640 AIRWAY INHALATION TREATMENT: CPT

## 2024-12-20 PROCEDURE — 94660 CPAP INITIATION&MGMT: CPT

## 2024-12-20 PROCEDURE — 6360000002 HC RX W HCPCS: Performed by: STUDENT IN AN ORGANIZED HEALTH CARE EDUCATION/TRAINING PROGRAM

## 2024-12-20 PROCEDURE — 2500000003 HC RX 250 WO HCPCS: Performed by: STUDENT IN AN ORGANIZED HEALTH CARE EDUCATION/TRAINING PROGRAM

## 2024-12-20 PROCEDURE — 2500000003 HC RX 250 WO HCPCS: Performed by: NURSE PRACTITIONER

## 2024-12-20 PROCEDURE — 6370000000 HC RX 637 (ALT 250 FOR IP): Performed by: NURSE PRACTITIONER

## 2024-12-20 PROCEDURE — 80048 BASIC METABOLIC PNL TOTAL CA: CPT

## 2024-12-20 PROCEDURE — 83036 HEMOGLOBIN GLYCOSYLATED A1C: CPT

## 2024-12-20 PROCEDURE — 82947 ASSAY GLUCOSE BLOOD QUANT: CPT

## 2024-12-20 PROCEDURE — 2060000000 HC ICU INTERMEDIATE R&B

## 2024-12-20 PROCEDURE — 97162 PT EVAL MOD COMPLEX 30 MIN: CPT

## 2024-12-20 RX ORDER — PREDNISONE 20 MG/1
40 TABLET ORAL DAILY
Status: COMPLETED | OUTPATIENT
Start: 2024-12-20 | End: 2024-12-23

## 2024-12-20 RX ORDER — ALPRAZOLAM 1 MG/1
1 TABLET ORAL NIGHTLY PRN
Status: DISCONTINUED | OUTPATIENT
Start: 2024-12-20 | End: 2024-12-21

## 2024-12-20 RX ORDER — INSULIN LISPRO 100 [IU]/ML
0-16 INJECTION, SOLUTION INTRAVENOUS; SUBCUTANEOUS EVERY 4 HOURS
Status: DISCONTINUED | OUTPATIENT
Start: 2024-12-20 | End: 2024-12-24 | Stop reason: HOSPADM

## 2024-12-20 RX ORDER — INSULIN GLARGINE 100 [IU]/ML
20 INJECTION, SOLUTION SUBCUTANEOUS ONCE
Status: COMPLETED | OUTPATIENT
Start: 2024-12-20 | End: 2024-12-20

## 2024-12-20 RX ORDER — AZITHROMYCIN 250 MG/1
500 TABLET, FILM COATED ORAL
Status: DISCONTINUED | OUTPATIENT
Start: 2024-12-20 | End: 2024-12-24 | Stop reason: HOSPADM

## 2024-12-20 RX ORDER — INSULIN GLARGINE 100 [IU]/ML
45 INJECTION, SOLUTION SUBCUTANEOUS NIGHTLY
Status: DISCONTINUED | OUTPATIENT
Start: 2024-12-20 | End: 2024-12-24 | Stop reason: HOSPADM

## 2024-12-20 RX ORDER — BUMETANIDE 0.25 MG/ML
2 INJECTION, SOLUTION INTRAMUSCULAR; INTRAVENOUS DAILY
Status: DISCONTINUED | OUTPATIENT
Start: 2024-12-20 | End: 2024-12-22

## 2024-12-20 RX ADMIN — APIXABAN 5 MG: 5 TABLET, FILM COATED ORAL at 07:41

## 2024-12-20 RX ADMIN — INSULIN LISPRO 16 UNITS: 100 INJECTION, SOLUTION INTRAVENOUS; SUBCUTANEOUS at 03:45

## 2024-12-20 RX ADMIN — BUMETANIDE 2 MG: 0.25 INJECTION INTRAMUSCULAR; INTRAVENOUS at 14:12

## 2024-12-20 RX ADMIN — BUDESONIDE AND FORMOTEROL FUMARATE DIHYDRATE 2 PUFF: 80; 4.5 AEROSOL RESPIRATORY (INHALATION) at 08:17

## 2024-12-20 RX ADMIN — INSULIN LISPRO 16 UNITS: 100 INJECTION, SOLUTION INTRAVENOUS; SUBCUTANEOUS at 19:31

## 2024-12-20 RX ADMIN — IPRATROPIUM BROMIDE AND ALBUTEROL SULFATE 1 DOSE: 2.5; .5 SOLUTION RESPIRATORY (INHALATION) at 16:13

## 2024-12-20 RX ADMIN — IPRATROPIUM BROMIDE AND ALBUTEROL SULFATE 1 DOSE: 2.5; .5 SOLUTION RESPIRATORY (INHALATION) at 00:09

## 2024-12-20 RX ADMIN — INSULIN GLARGINE 20 UNITS: 100 INJECTION, SOLUTION SUBCUTANEOUS at 11:34

## 2024-12-20 RX ADMIN — INSULIN LISPRO 8 UNITS: 100 INJECTION, SOLUTION INTRAVENOUS; SUBCUTANEOUS at 11:40

## 2024-12-20 RX ADMIN — INSULIN LISPRO 8 UNITS: 100 INJECTION, SOLUTION INTRAVENOUS; SUBCUTANEOUS at 15:24

## 2024-12-20 RX ADMIN — METHYLPREDNISOLONE SODIUM SUCCINATE 40 MG: 40 INJECTION INTRAMUSCULAR; INTRAVENOUS at 05:31

## 2024-12-20 RX ADMIN — PREDNISONE 40 MG: 20 TABLET ORAL at 11:41

## 2024-12-20 RX ADMIN — SODIUM CHLORIDE, PRESERVATIVE FREE 10 ML: 5 INJECTION INTRAVENOUS at 19:32

## 2024-12-20 RX ADMIN — BUDESONIDE AND FORMOTEROL FUMARATE DIHYDRATE 2 PUFF: 80; 4.5 AEROSOL RESPIRATORY (INHALATION) at 20:37

## 2024-12-20 RX ADMIN — INSULIN LISPRO 12 UNITS: 100 INJECTION, SOLUTION INTRAVENOUS; SUBCUTANEOUS at 22:38

## 2024-12-20 RX ADMIN — METOPROLOL SUCCINATE 25 MG: 25 TABLET, EXTENDED RELEASE ORAL at 07:41

## 2024-12-20 RX ADMIN — INSULIN GLARGINE 45 UNITS: 100 INJECTION, SOLUTION SUBCUTANEOUS at 19:26

## 2024-12-20 RX ADMIN — INSULIN LISPRO 8 UNITS: 100 INJECTION, SOLUTION INTRAVENOUS; SUBCUTANEOUS at 07:41

## 2024-12-20 RX ADMIN — IPRATROPIUM BROMIDE AND ALBUTEROL SULFATE 1 DOSE: 2.5; .5 SOLUTION RESPIRATORY (INHALATION) at 11:24

## 2024-12-20 RX ADMIN — APIXABAN 5 MG: 5 TABLET, FILM COATED ORAL at 19:26

## 2024-12-20 RX ADMIN — SODIUM CHLORIDE, PRESERVATIVE FREE 10 ML: 5 INJECTION INTRAVENOUS at 07:47

## 2024-12-20 RX ADMIN — OXYCODONE HYDROCHLORIDE AND ACETAMINOPHEN 2 TABLET: 5; 325 TABLET ORAL at 11:41

## 2024-12-20 RX ADMIN — POLYETHYLENE GLYCOL 3350 17 G: 17 POWDER, FOR SOLUTION ORAL at 07:45

## 2024-12-20 RX ADMIN — ASPIRIN 81 MG: 81 TABLET, COATED ORAL at 07:41

## 2024-12-20 RX ADMIN — ROFLUMILAST 500 MCG: 500 TABLET ORAL at 07:41

## 2024-12-20 RX ADMIN — IPRATROPIUM BROMIDE AND ALBUTEROL SULFATE 1 DOSE: 2.5; .5 SOLUTION RESPIRATORY (INHALATION) at 20:37

## 2024-12-20 RX ADMIN — ATORVASTATIN CALCIUM 20 MG: 20 TABLET, FILM COATED ORAL at 07:41

## 2024-12-20 RX ADMIN — AZITHROMYCIN DIHYDRATE 500 MG: 250 TABLET ORAL at 11:41

## 2024-12-20 RX ADMIN — TAMSULOSIN HYDROCHLORIDE 0.4 MG: 0.4 CAPSULE ORAL at 07:41

## 2024-12-20 RX ADMIN — IPRATROPIUM BROMIDE AND ALBUTEROL SULFATE 1 DOSE: 2.5; .5 SOLUTION RESPIRATORY (INHALATION) at 08:14

## 2024-12-20 RX ADMIN — ALPRAZOLAM 1 MG: 1 TABLET ORAL at 19:26

## 2024-12-20 ASSESSMENT — PAIN SCALES - GENERAL
PAINLEVEL_OUTOF10: 8

## 2024-12-20 ASSESSMENT — PAIN DESCRIPTION - LOCATION: LOCATION: BACK

## 2024-12-20 NOTE — CARE COORDINATION
Case Management Assessment  Initial Evaluation    Date/Time of Evaluation: 12/20/2024 8:57 AM  Assessment Completed by: GORDON NEWELL    If patient is discharged prior to next notation, then this note serves as note for discharge by case management.    Patient Name: Salvador Hatch                   YOB: 1955  Diagnosis: Acute respiratory distress [R06.03]  Urinary retention [R33.9]  COPD exacerbation (HCC) [J44.1]  DAVID (acute kidney injury) (HCC) [N17.9]                   Date / Time: 12/18/2024  5:37 PM    Patient Admission Status: Inpatient   Readmission Risk (Low < 19, Mod (19-27), High > 27): Readmission Risk Score: 14.2    Current PCP: No primary care provider on file.  PCP verified by CM? (P) Yes    Chart Reviewed: Yes      History Provided by: (P) Patient  Patient Orientation: (P) Alert and Oriented, Person, Place, Situation, Self    Patient Cognition: (P) Alert    Hospitalization in the last 30 days (Readmission):  No    If yes, Readmission Assessment in  Navigator will be completed.    Advance Directives:      Code Status: DNR-CCA   Patient's Primary Decision Maker is: (P) Legal Next of Kin      Discharge Planning:    Patient lives with: (P) Alone Type of Home: (P) House  Primary Care Giver: (P) Self  Patient Support Systems include: (P) Family Members   Current Financial resources: (P) Medicare  Current community resources:    Current services prior to admission: (P) Durable Medical Equipment, Oxygen Therapy            Current DME: (P) Cane, Wheelchair, Oxygen Therapy (Comment) (Trilogy, electric wheelchair, manual wheelchair)            Type of Home Care services:       ADLS  Prior functional level: (P) Assistance with the following:, Bathing, Dressing, Toileting, Cooking, Housework, Shopping, Mobility  Current functional level: (P) Assistance with the following:, Bathing, Dressing, Toileting, Cooking, Housework, Shopping, Mobility    PT AM-PAC:   /24  OT AM-PAC: 16 /24    Family  representative Salvador and his family were provided with a choice of provider and agrees with the discharge plan. Freedom of choice list with basic dialogue that supports the patient's individualized plan of care/goals and shares the quality data associated with the providers was provided to:     Patient Representative Name:       The Patient and/or Patient Representative Agree with the Discharge Plan?      GORDON NEWELL  Case Management Department  Ph:  Fax:    Case Management Services Information Letter Provided [x]

## 2024-12-20 NOTE — PROGRESS NOTES
medical history of COPD, emphysema, history of bronchoscopic lung volume reduction in 2022, chronic respiratory failure on home 4 to 5 L of oxygen, chronic prednisone, paroxysmal A-fib on Eliquis, CAD with stent, osteoarthritis, urinary retention with recent removal of Boston presents to the hospital with acute on chronic respiratory failure with shortness of breath.  Patient had void trial done 3 days ago and was having difficulty urinating.  New Boston catheter was placed.  He was noted to be in acute distress and ER, was placed on BiPAP.  Patient was noted to have swelling in the lower extremities.  Was also noted to have DAVID with creatinine trending up to 1.7.    Medications:     Allergies:  No Known Allergies    Current Meds:   Scheduled Meds:    insulin lispro  0-16 Units SubCUTAneous Q4H    predniSONE  40 mg Oral Daily    insulin glargine  45 Units SubCUTAneous Nightly    azithromycin  500 mg Oral Q MWF    bumetanide  2 mg IntraVENous Daily    ipratropium 0.5 mg-albuterol 2.5 mg  1 Dose Inhalation Q4H While awake    metoprolol succinate  25 mg Oral Daily    apixaban  5 mg Oral BID    influenza virus vaccine  1 Dose IntraMUSCular Prior to discharge    budesonide-formoterol  2 puff Inhalation BID RT    sodium chloride flush  5-40 mL IntraVENous 2 times per day    aspirin  81 mg Oral Daily    Roflumilast  500 mcg Oral Daily    atorvastatin  20 mg Oral Daily    tamsulosin  0.4 mg Oral Daily     Continuous Infusions:    dextrose      sodium chloride       PRN Meds: oxyCODONE-acetaminophen **OR** oxyCODONE-acetaminophen, ALPRAZolam, glucose, dextrose bolus **OR** dextrose bolus, glucagon (rDNA), dextrose, sodium chloride flush, sodium chloride, ondansetron **OR** ondansetron, polyethylene glycol, acetaminophen **OR** acetaminophen, albuterol    Data:     Past Medical History:   has a past medical history of Atrial flutter (HCC), CAD (coronary artery disease), COPD (chronic obstructive pulmonary disease) (HCC), and  follow-up with primary urologist outpatient at [facility].    Creatinine already back to normal, repeat BMP today pending.    Patient doesn't feel ready for discharge, targeting discharge in am    Elder Grace MD  12/20/2024  12:23 PM

## 2024-12-20 NOTE — PLAN OF CARE
Problem: Discharge Planning  Goal: Discharge to home or other facility with appropriate resources  Outcome: Progressing     Problem: ABCDS Injury Assessment  Goal: Absence of physical injury  Outcome: Progressing     Problem: Safety - Adult  Goal: Free from fall injury  Outcome: Progressing     Problem: Respiratory - Adult  Goal: Achieves optimal ventilation and oxygenation  Outcome: Progressing     Problem: Cardiovascular - Adult  Goal: Maintains optimal cardiac output and hemodynamic stability  Outcome: Progressing     Problem: Skin/Tissue Integrity - Adult  Goal: Skin integrity remains intact  Outcome: Progressing     Problem: Musculoskeletal - Adult  Goal: Return mobility to safest level of function  Outcome: Progressing     Problem: Infection - Adult  Goal: Absence of infection at discharge  Outcome: Progressing     Problem: Metabolic/Fluid and Electrolytes - Adult  Goal: Electrolytes maintained within normal limits  Outcome: Progressing     Problem: Skin/Tissue Integrity  Goal: Absence of new skin breakdown  Description: 1.  Monitor for areas of redness and/or skin breakdown  2.  Assess vascular access sites hourly  3.  Every 4-6 hours minimum:  Change oxygen saturation probe site  4.  Every 4-6 hours:  If on nasal continuous positive airway pressure, respiratory therapy assess nares and determine need for appliance change or resting period.  Outcome: Progressing     Problem: Pain  Goal: Verbalizes/displays adequate comfort level or baseline comfort level  Outcome: Progressing

## 2024-12-20 NOTE — PLAN OF CARE
Problem: Discharge Planning  Goal: Discharge to home or other facility with appropriate resources  12/19/2024 2154 by Mara Hidalgo RN  Outcome: Progressing  12/19/2024 1647 by Sanam Sanchez RN  Outcome: Progressing  Flowsheets (Taken 12/19/2024 0800)  Discharge to home or other facility with appropriate resources: Identify barriers to discharge with patient and caregiver     Problem: ABCDS Injury Assessment  Goal: Absence of physical injury  12/19/2024 2154 by Mara Hidalgo RN  Outcome: Progressing  12/19/2024 1647 by Sanam Sanchez RN  Outcome: Progressing     Problem: Safety - Adult  Goal: Free from fall injury  12/19/2024 2154 by Mara Hidalgo RN  Outcome: Progressing  12/19/2024 1647 by Sanam Sanchez RN  Outcome: Progressing     Problem: Neurosensory - Adult  Goal: Achieves stable or improved neurological status  12/19/2024 2154 by Mara Hidalgo RN  Outcome: Progressing  12/19/2024 1647 by Sanam Sanchez RN  Outcome: Progressing     Problem: Respiratory - Adult  Goal: Achieves optimal ventilation and oxygenation  12/19/2024 2154 by Mara Hidalgo RN  Outcome: Progressing  12/19/2024 1647 by Sanam Sanchez RN  Outcome: Progressing     Problem: Cardiovascular - Adult  Goal: Maintains optimal cardiac output and hemodynamic stability  12/19/2024 2154 by Mara Hidalgo RN  Outcome: Progressing  12/19/2024 1647 by Sanam Sanchez RN  Outcome: Progressing     Problem: Skin/Tissue Integrity - Adult  Goal: Skin integrity remains intact  12/19/2024 2154 by Mara Hidalgo RN  Outcome: Progressing  12/19/2024 1647 by Sanam Sanchez RN  Outcome: Progressing  Flowsheets (Taken 12/19/2024 1643)  Skin Integrity Remains Intact: Monitor for areas of redness and/or skin breakdown     Problem: Musculoskeletal - Adult  Goal: Return mobility to safest level of function  12/19/2024 2154 by Mara Hidalgo RN  Outcome: Progressing  12/19/2024 1647 by Sanam Sanchez RN  Outcome:

## 2024-12-20 NOTE — PROGRESS NOTES
Salvador Hatch was evaluated today and a DME order was entered for a semi-electric hospital bed because he requires assistance for positioning needs not possible in an ordinary bed.  Patient requires frequent and immediate positioning changes for relief of pain and care needs related to medical diagnoses.  Patient needs variability of bed height requirements to perform patient transfers and for personal cares.  Current body Weight - Scale: 101.4 kg (223 lb 8.7 oz).  The need for this equipment was discussed with the patient and he understands and is in agreement.

## 2024-12-20 NOTE — CARE COORDINATION
Met with the patient and his sister Marichuy. States the family will take him home and provide care. Requests a referral be sent to [facility]. Requests a hospital bed -  states they have already prepared a room for him down stairs. States he has [facility] and sees Martina Bishop, SOHAN, CNP.    1641 Referral faxed to [facility].    1652 Faxed hospital bed order to [facility].    1705 Received a call from Ca with [facility]. Patient declined due to being at capacity for patients insurance provider.

## 2024-12-20 NOTE — PROGRESS NOTES
Pulmonary/Critical Care consultation    Patient's name:  Salvador Hatch  Medical Record Number: [id number]  Patient's account/billing number: [id number]  Patient's YOB: 1955  Age: 69 y.o.  Date of Admission: 12/18/2024  5:37 PM  Date of Consult: 12/20/2024      Primary Care Physician: No primary care provider on file.      Code Status: DNR-CCA    Reason for consult: COPD exacerbation    Subjective: 12/20/24  Patient used BiPAP overnight  This morning he is on 4 L of oxygen saturating well  On laying down in bed no shortness of breath but patient feels he might feel short of breath on exertion  Denies coughing    HISTORY OF PRESENT ILLNESS:   History was obtained from chart review and the patient.  Salvador Hatch is a 69 y.o. with past medical history of COPD/emphysema-s/p bronchoscopy lung volume reduction 6/22, chronic respiratory failure on 4 to 5 L of oxygen at home, on chronic prednisone 20 mg daily and azithromycin- 3 times a week due to advanced emphysema in the setting of lung transplant refusal, paroxysmal A-fib, CAD-s/p stenting RCA 2013, urinary retention with Boston, recent admission at [facility] for COPD exacerbation and UTI presented with difficulty in breathing.  Patient had a Boston catheter removed on Monday for a voiding trial and was having difficulty urinating himself along with SOB, Ems was called he was initially he was placed on CPAP and initial sats in 80s on arrival to ER patient was speaking only 1-2 word sentences and was switched to BiPAP with improvement in respiratory status, was also found to have 2+ bilateral lower extremity edema, denied chest pain, nausea, vomiting, EKG showed right axis deviation  Urinary retention with bladder scan showing 700 mL  On BiPAP 40% FiO2, otherwise hemodynamically stable  VBG-7.4/42/55/28  DAVID with creatinine 1.4-1.7  Chest x-ray-soft tissue fullness in left hilum and AP window with  and AP window with faint curvilinear radiopacities in the left hilar region of uncertain etiology and significance.  The soft tissue fullness may be due to adenopathy or mass. Recommend follow-up CT chest with contrast. 2. Increasing bibasilar opacity compared with the study performed approximately 4 hours earlier. This could be due to increasing atelectasis or pneumonia. 3. Possible trace pleural fluid layering posteriorly.     XR CHEST PORTABLE    Result Date: 12/18/2024  Mild cardiomegaly and mild central pulmonary congestion. Chronic obstructive lung changes with mild bibasilar atelectasis vs early infiltrates or scarring     XR CHEST 1 VIEW    Result Date: 11/25/2024  Clinical Information: Shortness of breath. Comparison: 11/21/24. * Vascular congestion with basilar airspace opacifications noted. No effusions. Cardiomegaly with prominent pulmonary arteries. Electronically signed: Carson Taylor MD.    XR CHEST 1 VIEW    Result Date: 11/21/2024  Unchanged from [place] with no new acute pulmonary abnormality is detected Electronically signed: Humberto Araya.          Assessment and Plan       IMPRESSION:   1. COPD exacerbation (HCC)    2. Acute respiratory distress    3. Urinary retention    4. DAVID (acute kidney injury) (HCC)        Principal Problem:    COPD with acute exacerbation (HCC)  Active Problems:    Atrial flutter (HCC)    CAD (coronary artery disease)    COPD (chronic obstructive pulmonary disease) (HCC)    Osteoarthritis    DAVID (acute kidney injury) (HCC)    Acute on chronic hypoxic respiratory failure    Pulmonary congestion    BPH (benign prostatic hyperplasia)    Urinary retention    COPD exacerbation (HCC)    Acute respiratory distress  Resolved Problems:    * No resolved hospital problems. *      PLAN:       Stage 4 copd and emphysema on chronic hypoxic and hypercapnic respiratory failure , post ZHOU endobronchial valves .  Continue  chronic 20 mg po prednisone , DALIRESP , azithromycin 500 mg MWF

## 2024-12-20 NOTE — PROGRESS NOTES
Patient requested xanax to be increased to 1 mg as the was his home dose. Attending was notified. Writer asked for a diet change because patient is struggling to eat foods that need to be chewed.

## 2024-12-20 NOTE — PROGRESS NOTES
Patient asked for ensure as he was having a hard time chewing food. Attending notified. Order placed.

## 2024-12-20 NOTE — PLAN OF CARE
Problem: Respiratory - Adult  Goal: Achieves optimal ventilation and oxygenation  12/19/2024 2202 by Aislinn Perez RCP  Outcome: Progressing

## 2024-12-20 NOTE — PROGRESS NOTES
Exceptions  Arousal/Alertness: Appears intact  Following Commands: Follows one step commands with repetition  Attention Span: Attends with cues to redirect  Safety Judgement: Decreased awareness of need for assistance;Decreased awareness of need for safety  Insights: Impaired  Initiation: Requires cues for some    Objective  Temp: 97.5 °F (36.4 °C)  Pulse: 100  Heart Rate Source: Monitor  Respirations: 23  SpO2: 96 %  O2 Device: Nasal cannula  BP: 137/87  MAP (Calculated): 104  BP Location: Right upper arm  BP Method: Automatic  Patient Position: Semi fowlers             AROM RLE (degrees)  RLE AROM: WFL  AROM LLE (degrees)  LLE AROM : WFL  Strength RLE  Strength RLE: Exception  R Hip Flexion: 3+/5  R Knee Flexion: 3+/5  R Knee Extension: 3+/5  R Ankle Dorsiflexion: 3+/5  R Ankle Plantar flexion: 3+/5  Strength LLE  Strength LLE: Exception  L Hip Flexion: 3+/5  L Knee Flexion: 3+/5  L Knee Extension: 3+/5  L Ankle Dorsiflexion: 3+/5  L Ankle Plantar Flexion: 3+/5           Bed mobility  Bed Mobility Comments: pt up to side of bed with nursing, retired to the chair  Transfers  Sit to Stand: Contact guard assistance  Stand to Sit: Contact guard assistance  Bed to Chair: Contact guard assistance  Comment: pt up to rw  Ambulation  Surface: Level tile  Device: Rolling Walker  Assistance: Contact guard assistance  Gait Deviations: Slow Codi;Shuffles  Distance: 4 ft from bed to chair  Comments: limited by endurance and SOB  More Ambulation?: No  Stairs/Curb  Stairs?: No     Balance  Posture: Fair  Sitting - Static: Good  Sitting - Dynamic: Fair;+  Standing - Static: Fair;+  Standing - Dynamic: Fair  Comments: standing balance with B UE support, seated unsupported at side of bed x 10 minutes, SUP        OutComes Score    AM-PAC - Mobility    AM-PAC Basic Mobility - Inpatient   How much help is needed turning from your back to your side while in a flat bed without using bedrails?: A Little  How much help is needed moving  from lying on your back to sitting on the side of a flat bed without using bedrails?: A Little  How much help is needed moving to and from a bed to a chair?: A Little  How much help is needed standing up from a chair using your arms?: A Little  How much help is needed walking in hospital room?: A Lot  How much help is needed climbing 3-5 steps with a railing?: A Lot  AM-PAC Inpatient Mobility Raw Score : 16  AM-PAC Inpatient T-Scale Score : 40.78  Mobility Inpatient CMS 0-100% Score: 54.16  Mobility Inpatient CMS G-Code Modifier : CK      Goals  Short Term Goals  Time Frame for Short Term Goals: 14 visits  Short Term Goal 1: Pt indep with bed mobility  Short Term Goal 2: Pt transfer with SBA, least restrictive device  Short Term Goal 3: Pt ambulate 50 ft with least restrictive device and SBA  Short Term Goal 4: Pt negotiate 8 stairs with 1 rail and CGA as needed to enter home  Patient Goals   Patient Goals : To return home soon       Education  Patient Education  Education Given To: Patient  Education Provided: Role of Therapy;Plan of Care  Education Method: Verbal  Barriers to Learning: None  Education Outcome: Verbalized understanding;Demonstrated understanding      Therapy Time   Individual Concurrent Group Co-treatment   Time In 1117         Time Out 1134         Minutes 17                 Rosa Wang, PT

## 2024-12-20 NOTE — PROGRESS NOTES
Patient received bath after having large bowel movement. Linens changed. Patient had difficulty due to worsening dyspnea.

## 2024-12-21 ENCOUNTER — APPOINTMENT (OUTPATIENT)
Dept: GENERAL RADIOLOGY | Age: 69
DRG: 189 | End: 2024-12-21
Payer: MEDICARE

## 2024-12-21 LAB
GLUCOSE BLD-MCNC: 142 MG/DL (ref 75–110)
GLUCOSE BLD-MCNC: 242 MG/DL (ref 75–110)
GLUCOSE BLD-MCNC: 279 MG/DL (ref 75–110)
GLUCOSE BLD-MCNC: 346 MG/DL (ref 75–110)
GLUCOSE BLD-MCNC: 383 MG/DL (ref 75–110)

## 2024-12-21 PROCEDURE — 6360000002 HC RX W HCPCS: Performed by: STUDENT IN AN ORGANIZED HEALTH CARE EDUCATION/TRAINING PROGRAM

## 2024-12-21 PROCEDURE — 99239 HOSP IP/OBS DSCHRG MGMT >30: CPT | Performed by: STUDENT IN AN ORGANIZED HEALTH CARE EDUCATION/TRAINING PROGRAM

## 2024-12-21 PROCEDURE — 82947 ASSAY GLUCOSE BLOOD QUANT: CPT

## 2024-12-21 PROCEDURE — 74230 X-RAY XM SWLNG FUNCJ C+: CPT

## 2024-12-21 PROCEDURE — 6370000000 HC RX 637 (ALT 250 FOR IP)

## 2024-12-21 PROCEDURE — 2500000003 HC RX 250 WO HCPCS: Performed by: NURSE PRACTITIONER

## 2024-12-21 PROCEDURE — 6370000000 HC RX 637 (ALT 250 FOR IP): Performed by: NURSE PRACTITIONER

## 2024-12-21 PROCEDURE — 6370000000 HC RX 637 (ALT 250 FOR IP): Performed by: STUDENT IN AN ORGANIZED HEALTH CARE EDUCATION/TRAINING PROGRAM

## 2024-12-21 PROCEDURE — 6370000000 HC RX 637 (ALT 250 FOR IP): Performed by: INTERNAL MEDICINE

## 2024-12-21 PROCEDURE — 99232 SBSQ HOSP IP/OBS MODERATE 35: CPT | Performed by: INTERNAL MEDICINE

## 2024-12-21 PROCEDURE — 94640 AIRWAY INHALATION TREATMENT: CPT

## 2024-12-21 PROCEDURE — 2700000000 HC OXYGEN THERAPY PER DAY

## 2024-12-21 PROCEDURE — 92611 MOTION FLUOROSCOPY/SWALLOW: CPT

## 2024-12-21 PROCEDURE — 2060000000 HC ICU INTERMEDIATE R&B

## 2024-12-21 PROCEDURE — 94660 CPAP INITIATION&MGMT: CPT

## 2024-12-21 RX ORDER — AZITHROMYCIN 500 MG/1
500 TABLET, FILM COATED ORAL
Qty: 30 TABLET | Refills: 1 | Status: SHIPPED | OUTPATIENT
Start: 2024-12-23

## 2024-12-21 RX ORDER — OXYCODONE AND ACETAMINOPHEN 5; 325 MG/1; MG/1
1 TABLET ORAL EVERY 6 HOURS PRN
Qty: 10 TABLET | Refills: 0 | Status: SHIPPED | OUTPATIENT
Start: 2024-12-21 | End: 2024-12-28

## 2024-12-21 RX ORDER — ALPRAZOLAM 1 MG/1
1 TABLET ORAL 2 TIMES DAILY PRN
Qty: 10 TABLET | Refills: 0 | Status: SHIPPED | OUTPATIENT
Start: 2024-12-21 | End: 2024-12-26

## 2024-12-21 RX ORDER — ALPRAZOLAM 1 MG/1
1 TABLET ORAL 2 TIMES DAILY PRN
Status: DISCONTINUED | OUTPATIENT
Start: 2024-12-21 | End: 2024-12-24 | Stop reason: HOSPADM

## 2024-12-21 RX ORDER — PREDNISONE 20 MG/1
TABLET ORAL
Qty: 10 TABLET | Refills: 0 | Status: SHIPPED | OUTPATIENT
Start: 2024-12-21

## 2024-12-21 RX ORDER — GABAPENTIN 100 MG/1
100 CAPSULE ORAL DAILY
Qty: 30 CAPSULE | Refills: 0 | Status: SHIPPED | OUTPATIENT
Start: 2024-12-21 | End: 2024-12-21

## 2024-12-21 RX ORDER — BUMETANIDE 0.25 MG/ML
2 INJECTION, SOLUTION INTRAMUSCULAR; INTRAVENOUS DAILY
Qty: 30 EACH | Refills: 2 | Status: SHIPPED | OUTPATIENT
Start: 2024-12-22 | End: 2024-12-22 | Stop reason: HOSPADM

## 2024-12-21 RX ORDER — GABAPENTIN 100 MG/1
100 CAPSULE ORAL DAILY
Qty: 30 CAPSULE | Refills: 0 | Status: SHIPPED | OUTPATIENT
Start: 2024-12-21 | End: 2025-01-20

## 2024-12-21 RX ADMIN — BUMETANIDE 2 MG: 0.25 INJECTION INTRAMUSCULAR; INTRAVENOUS at 08:09

## 2024-12-21 RX ADMIN — ALPRAZOLAM 1 MG: 1 TABLET ORAL at 19:47

## 2024-12-21 RX ADMIN — BUDESONIDE AND FORMOTEROL FUMARATE DIHYDRATE 2 PUFF: 80; 4.5 AEROSOL RESPIRATORY (INHALATION) at 08:04

## 2024-12-21 RX ADMIN — INSULIN LISPRO 4 UNITS: 100 INJECTION, SOLUTION INTRAVENOUS; SUBCUTANEOUS at 22:46

## 2024-12-21 RX ADMIN — METOPROLOL SUCCINATE 25 MG: 25 TABLET, EXTENDED RELEASE ORAL at 08:11

## 2024-12-21 RX ADMIN — IPRATROPIUM BROMIDE AND ALBUTEROL SULFATE 1 DOSE: 2.5; .5 SOLUTION RESPIRATORY (INHALATION) at 11:38

## 2024-12-21 RX ADMIN — ROFLUMILAST 500 MCG: 500 TABLET ORAL at 08:12

## 2024-12-21 RX ADMIN — INSULIN LISPRO 16 UNITS: 100 INJECTION, SOLUTION INTRAVENOUS; SUBCUTANEOUS at 15:51

## 2024-12-21 RX ADMIN — IPRATROPIUM BROMIDE AND ALBUTEROL SULFATE 1 DOSE: 2.5; .5 SOLUTION RESPIRATORY (INHALATION) at 19:45

## 2024-12-21 RX ADMIN — IPRATROPIUM BROMIDE AND ALBUTEROL SULFATE 1 DOSE: 2.5; .5 SOLUTION RESPIRATORY (INHALATION) at 08:02

## 2024-12-21 RX ADMIN — BUDESONIDE AND FORMOTEROL FUMARATE DIHYDRATE 2 PUFF: 80; 4.5 AEROSOL RESPIRATORY (INHALATION) at 19:45

## 2024-12-21 RX ADMIN — SODIUM CHLORIDE, PRESERVATIVE FREE 10 ML: 5 INJECTION INTRAVENOUS at 19:38

## 2024-12-21 RX ADMIN — SODIUM CHLORIDE, PRESERVATIVE FREE 10 ML: 5 INJECTION INTRAVENOUS at 08:13

## 2024-12-21 RX ADMIN — ATORVASTATIN CALCIUM 20 MG: 20 TABLET, FILM COATED ORAL at 08:11

## 2024-12-21 RX ADMIN — PREDNISONE 40 MG: 20 TABLET ORAL at 08:11

## 2024-12-21 RX ADMIN — INSULIN LISPRO 12 UNITS: 100 INJECTION, SOLUTION INTRAVENOUS; SUBCUTANEOUS at 19:45

## 2024-12-21 RX ADMIN — ASPIRIN 81 MG: 81 TABLET, COATED ORAL at 08:11

## 2024-12-21 RX ADMIN — APIXABAN 5 MG: 5 TABLET, FILM COATED ORAL at 08:11

## 2024-12-21 RX ADMIN — INSULIN GLARGINE 45 UNITS: 100 INJECTION, SOLUTION SUBCUTANEOUS at 19:38

## 2024-12-21 RX ADMIN — APIXABAN 5 MG: 5 TABLET, FILM COATED ORAL at 19:38

## 2024-12-21 RX ADMIN — INSULIN LISPRO 12 UNITS: 100 INJECTION, SOLUTION INTRAVENOUS; SUBCUTANEOUS at 11:50

## 2024-12-21 RX ADMIN — TAMSULOSIN HYDROCHLORIDE 0.4 MG: 0.4 CAPSULE ORAL at 08:11

## 2024-12-21 RX ADMIN — IPRATROPIUM BROMIDE AND ALBUTEROL SULFATE 1 DOSE: 2.5; .5 SOLUTION RESPIRATORY (INHALATION) at 15:04

## 2024-12-21 RX ADMIN — INSULIN LISPRO 8 UNITS: 100 INJECTION, SOLUTION INTRAVENOUS; SUBCUTANEOUS at 03:06

## 2024-12-21 NOTE — PROGRESS NOTES
Blood Glucose 376 at dinner check. Attending notified. Writer gave 16 units humalog per order as attending requested. No new orders.

## 2024-12-21 NOTE — CARE COORDINATION
Spoke to Aris at [facility] and was informed they are able to accept.

## 2024-12-21 NOTE — PLAN OF CARE
Problem: Respiratory - Adult  Goal: Achieves optimal ventilation and oxygenation  12/20/2024 2046 by Shantel Sheehan RCP  Outcome: Progressing   BRONCHOSPASM/BRONCHOCONSTRICTION     [x]         IMPROVE AERATION/BREATH SOUNDS  [x]   ADMINISTER BRONCHODILATOR THERAPY AS APPROPRIATE  [x]   ASSESS BREATH SOUNDS  []   IMPLEMENT AEROSOL/MDI PROTOCOL  [x]   PATIENT EDUCATION AS NEEDED  NONINVASIVE VENTILATION    PROVIDE OPTIMAL VENTILATION/ACCEPTABLE SPO2   IMPLEMENT NONINVASIVE VENTILATION PROTOCOL   MAINTAIN ACCEPTABLE SPO2   ASSESS SKIN INTEGRITY/BREAKDOWN SCORE   PATIENT EDUCATION AS NEEDED   BIPAP AS NEEDED

## 2024-12-21 NOTE — CARE COORDINATION
Spoke to patient and family regarding transitional needs. Had a long discussion with pt. And his sister regarding safe discharge. It was discussed that patient isn't able to ambulate enough to care for self alone since sister has to work. Also, addressed concerns regarding f/u care appointments and being unable to get to the car to make appointments. Pt. Expressed that he isn't comfortable pursuing Hospice services again but knows he cannot go home safely. Pt. Was agreeable to pursue SNF placement. Referrals sent to choices provided by sister.

## 2024-12-21 NOTE — PROGRESS NOTES
window with faint curvilinear radiopacities in the left hilar region of uncertain etiology and significance.  The soft tissue fullness may be due to adenopathy or mass. Recommend follow-up CT chest with contrast. 2. Increasing bibasilar opacity compared with the study performed approximately 4 hours earlier. This could be due to increasing atelectasis or pneumonia. 3. Possible trace pleural fluid layering posteriorly.     XR CHEST PORTABLE    Result Date: 12/18/2024  Mild cardiomegaly and mild central pulmonary congestion. Chronic obstructive lung changes with mild bibasilar atelectasis vs early infiltrates or scarring     XR CHEST 1 VIEW    Result Date: 11/25/2024  Clinical Information: Shortness of breath. Comparison: 11/21/24. * Vascular congestion with basilar airspace opacifications noted. No effusions. Cardiomegaly with prominent pulmonary arteries. Electronically signed: Carson Taylor MD.    XR CHEST 1 VIEW    Result Date: 11/21/2024  Unchanged from [place] with no new acute pulmonary abnormality is detected Electronically signed: Humberto Araya.          Assessment and Plan       IMPRESSION:   1. COPD exacerbation (HCC)    2. Acute respiratory distress    3. Urinary retention    4. DAVID (acute kidney injury) (HCC)        Principal Problem:    COPD with acute exacerbation (HCC)  Active Problems:    Atrial flutter (HCC)    CAD (coronary artery disease)    COPD (chronic obstructive pulmonary disease) (HCC)    Osteoarthritis    DAVID (acute kidney injury) (HCC)    Acute on chronic hypoxic respiratory failure    Pulmonary congestion    BPH (benign prostatic hyperplasia)    Urinary retention    COPD exacerbation (HCC)    Acute respiratory distress  Resolved Problems:    * No resolved hospital problems. *      PLAN:       Stage 4 copd and emphysema on chronic hypoxic and hypercapnic respiratory failure , post ZHOU endobronchial valves .  Continue  chronic 20 mg po prednisone , DALIRESP , azithromycin 500 mg MWF ,  Pulmicort and brovana nebulized bid , Yupelri nebulized once a day and duo neb as needed   Home trilogy machine and home o2   Continue po prednisone 40 mg po daily for total of 4 days then home dose    Has been evaluated by speech therapist and is on minced and moist diet.  Ok to  dc from pulmonary standpoint.      Thank you for having us involved in the care of your patient. Please call us if you have any questions or concerns.    Triston Atwood MD  Internal medicine resident, PGY 3  [facility] [place]  12/21/24 7:51 AM      Please note that this chart was generated using voice recognition Dragon dictation software.  Although every effort was made to ensure the accuracy of this automated transcription, some errors in transcription may have occurred.

## 2024-12-21 NOTE — DISCHARGE INSTR - COC
Continuity of Care Form    Patient Name: Salvador Hatch   :  1955  MRN:  [id number]    Admit date:  2024  Discharge date:  24    Code Status Order: DNR-CCA   Advance Directives:   Advance Care Flowsheet Documentation             Admitting Physician:  No admitting provider for patient encounter.  PCP: No primary care provider on file.    Discharging Nurse: Kym KLEIN  Discharging Hospital Unit/Room#: 0406/0406-01  Discharging Unit Phone Number: [phone]    Emergency Contact:   Extended Emergency Contact Information  Primary Emergency Contact: Karen Hicks  Mobile Phone: [phone]  Relation: Child   needed? No  Secondary Emergency Contact: Marichuy Card   [facility]  Home Phone: [phone]  Relation: Brother/Sister    Past Surgical History:  History reviewed. No pertinent surgical history.    Immunization History:   Immunization History   Administered Date(s) Administered    COVID-19, PFIZER Bivalent, DO NOT Dilute, (age 12y+), IM, 30 mcg/0.3 mL 10/18/2022    COVID-19, PFIZER PURPLE top, DILUTE for use, (age 12 y+), 30mcg/0.3mL 2022    COVID-19, PFIZER, (age 12y+), IM, 30mcg/0.3mL 2024    COVID-19, US Vaccine, Vaccine Unspecified 2021, 2021, 10/17/2021       Active Problems:  Patient Active Problem List   Diagnosis Code    Atrial flutter ([facility]) I48.92    CAD (coronary artery disease) I25.10    COPD (chronic obstructive pulmonary disease) ([facility]) J44.9    Osteoarthritis M19.90    DAVID (acute kidney injury) ([facility]) N17.9    Acute on chronic hypoxic respiratory failure J96.21    Pulmonary congestion R09.89    BPH (benign prostatic hyperplasia) N40.0    Urinary retention R33.9    COPD with acute exacerbation ([facility]) J44.1    COPD exacerbation ([facility]) J44.1    Acute respiratory distress R06.03       Isolation/Infection:   Isolation            No Isolation          Patient Infection Status       None to display                     Nurse Assessment:  Last Vital  Signs: /86   Pulse 81   Temp 98.5 °F (36.9 °C) (Oral)   Resp 13   Ht 1.803 m (5' 11\")   Wt 101.4 kg (223 lb 8.7 oz)   SpO2 100%   BMI 31.18 kg/m²     Last documented pain score (0-10 scale): Pain Level: 8  Last Weight:   Wt Readings from Last 1 Encounters:   12/19/24 101.4 kg (223 lb 8.7 oz)     Mental Status:  oriented and alert    IV Access:  - None    Nursing Mobility/ADLs:  Walking   Assisted  Transfer  Assisted  Bathing  Assisted  Dressing  Assisted  Toileting  Assisted  Feeding  Independent  Med Admin  Assisted  Med Delivery   whole    Wound Care Documentation and Therapy:        Elimination:  Continence:   Bowel: Yes  Bladder: bee  Urinary Catheter: Insertion Date: 12/18    Colostomy/Ileostomy/Ileal Conduit: No       Date of Last BM: 12/23/24    Intake/Output Summary (Last 24 hours) at 12/21/2024 1048  Last data filed at 12/21/2024 0406  Gross per 24 hour   Intake --   Output 1850 ml   Net -1850 ml     I/O last 3 completed shifts:  In: 400 [P.O.:400]  Out: 3900 [Urine:3900]    Safety Concerns:     At Risk for Falls    Impairments/Disabilities:      None    Nutrition Therapy:  Current Nutrition Therapy:   - Oral Diet:  Carb Control 4 carbs/meal (1800kcals/day) Minced and moist    Routes of Feeding: Oral  Liquids: Thin Liquids  Daily Fluid Restriction: no  Last Modified Barium Swallow with Video (Video Swallowing Test): not done    Treatments at the Time of Hospital Discharge:   Respiratory Treatments: albuterol,   Oxygen Therapy:  is on oxygen at 4 L/min per nasal cannula.  Ventilator:    - BiPAP   IPAP: 14 cmH20, CPAP/EPAP: 6 cmH2O only when sleeping and PRN    Rehab Therapies: Physical Therapy, Occupational Therapy, and Speech/Language Therapy  Weight Bearing Status/Restrictions: No weight bearing restrictions  Other Medical Equipment (for information only, NOT a DME order):  walker, bath bench, and bedside commode  Other Treatments: n/a    Patient's personal belongings (please select all that

## 2024-12-21 NOTE — PROGRESS NOTES
[facility]  Office: [phone]  Ha Ledezma DO, Marbin Josue DO, Charles Dueñas DO, Nabor Doyle DO, Kristi Cabral MD, Kena Purvis MD, Rohini Vasquez MD, Marija Hagan MD,  Latrell De La Cruz MD, Chaz Lynch MD, Sandra Bernal MD,  Tommy Rees DO, Elder Grace MD, Arnold Sabillon MD, Shyam Ledezma DO, Vale Billings MD,  Easton Norris DO, Laura Cotto MD, Aida Thompson MD, Joyce Walker MD, Nolvia Will MD,  Jimbo Perdue MD, Beau Doran MD, Santiago Carnes MD, Kevin Mccormick MD, Christ Beavers MD, Sabino Carrero MD, Geoffrey Norton DO, Sinan Mchugh MD, Shirley Waterhouse, CNP,  Sara Koroma, CNP, Geoffrey Valdes, CNP,  Angy Jensen, KRISTOPHER, Allie Barcenas, CNP, Suzy Hickey, CNP, Mitra Mccray, CNP, Bebe Mendoza, CNP, ABILIO RollinsC, ABILIO MondragonC, Navya Hanna, CNP, Pao Ritchie, CNP,  Tosha Liang, CNP, Lesli Mirza, CNP,  Bonnie Munoz, CNP, Rachelle Bear, CNP         [facility]   IN-PATIENT SERVICE   [facility]    Progress Note    12/21/2024    12:02 PM    Name:   Salvador Hatch  MRN:     [id number]     Acct:      [id number]   Room:   [facility]/0406-01   Day:  3  Admit Date:  12/18/2024  5:37 PM    PCP:   No primary care provider on file.  Code Status:  DNR-CCA    Subjective:     C/C:   Chief Complaint   Patient presents with    Shortness of Breath     Interval History Status: not changed.     Patient seen and examined.    Patient gets anxious in between and starts hyperventilating.  He wants to wear BiPAP multiple times a day.  Explained to the patient that due to his COPD he will have some chronic shortness of breath but we need to establish his baseline.  He has trilogy ventilator at home and will continue to use that.  We are trying to arrange hospital bed for him.  He had swallow study done today and qualified for minced and moist diet.  Does not report any chest pain.  Reports some leg pain but no  focal abnormality.    Brief History:     69-year-old male with past medical history of COPD, emphysema, history of bronchoscopic lung volume reduction in 2022, chronic respiratory failure on home 4 to 5 L of oxygen, chronic prednisone, paroxysmal A-fib on Eliquis, CAD with stent, osteoarthritis, urinary retention with recent removal of [place] presents to the hospital with acute on chronic respiratory failure with shortness of breath.  Patient had void trial done 3 days ago and was having difficulty urinating.  New Boston catheter was placed.  He was noted to be in acute distress and ER, was placed on BiPAP.  Patient was noted to have swelling in the lower extremities.  Patient was restarted on home Bumex, breathing treatments and steroids were given.  Home azithromycin was started.  Patient had improvement in his breathing and was switched back from BiPAP to his home nasal cannula oxygen.  He does have trilogy ventilator at home.  Pulmonary cleared for discharge.  Urology saw the patient and recommended follow-up with primary urologist with Boston catheter in place.    Patient refused to go to his previous facility and wanted to go home.  Hospital bed DME was placed.  Family will try to arrange help at home.  Home care was ordered.    Patient had swallow study as he complained of sounds difficulty eating and qualified for minced and moist diet.    Medications:     Allergies:  No Known Allergies    Current Meds:   Scheduled Meds:    insulin lispro  0-16 Units SubCUTAneous Q4H    predniSONE  40 mg Oral Daily    insulin glargine  45 Units SubCUTAneous Nightly    azithromycin  500 mg Oral Q MWF    bumetanide  2 mg IntraVENous Daily    ipratropium 0.5 mg-albuterol 2.5 mg  1 Dose Inhalation Q4H While awake    metoprolol succinate  25 mg Oral Daily    apixaban  5 mg Oral BID    influenza virus vaccine  1 Dose IntraMUSCular Prior to discharge    budesonide-formoterol  2 puff Inhalation BID RT    sodium chloride flush  5-40 mL

## 2024-12-21 NOTE — DISCHARGE SUMMARY
[facility]  Office: [phone]  Ha Ledezma DO, Marbin Josue DO, Charles Dueñas DO, Nabor Doyle DO, Kristi Cabral MD, Kena Purvis MD, Rohini Vasquez MD, Mraija Hagan MD,  Latrell De La Cruz MD, Chaz Lynch MD, Sandra Bernal MD,  Tommy Rees DO, Elder Grace MD, Arnold Sabillon MD, Shyam Ledezma DO, Vale Billings MD,  Easton Norris DO, Laura Cotto MD, Aida Thompson MD, Joyce Walker MD, Nolvia Will MD,  Jimbo Perdue MD, Beau Doran MD, Santiago Carnes MD, Kevin Mccormick MD, Christ Beavers MD, Sabino Carrero MD, Geoffrey Norton DO, Sinan Mchugh MD, Shirley Waterhouse, CNP,  Sara Koroma CNP, Geoffrey Valdes, BARABRA,  Angy Jensen, KRISTOPHER, Allie Barcenas, CNP, Suzy Hickey, CNP, Mitra Mccray, CNP, Bebe Mendoza, CNP, Alma Galindo PA-C, Anabelle Khanna PA-C, Navya Hanna, CNP, Pao Ritchie, CNP,  Tosha Liang, CNP, Lesli Mirza, CNP,  Bonnie Munoz, CNP, Rachelle Bear, CNP         [facility]   IN-PATIENT SERVICE   [facility]    Discharge Summary     Patient ID: Salvador Hatch  :  1955   MRN: [id number]     ACCOUNT:  [id number]   Patient's PCP: No primary care provider on file.  Admit Date: 2024   Discharge Date: 2024     Length of Stay: 3  Code Status:  DNR-CCA  Admitting Physician: No admitting provider for patient encounter.  Discharge Physician: Elder Grace MD     Active Discharge Diagnoses:     Hospital Problem Lists:  Principal Problem:    COPD with acute exacerbation (HCC)  Active Problems:    Atrial flutter (HCC)    CAD (coronary artery disease)    COPD (chronic obstructive pulmonary disease) (HCC)    Osteoarthritis    DAVID (acute kidney injury) (HCC)    Acute on chronic hypoxic respiratory failure    Pulmonary congestion    BPH (benign prostatic hyperplasia)    Urinary retention    COPD exacerbation (HCC)    Acute respiratory distress  Resolved Problems:    * No resolved hospital problems.  take  when to take this  reasons to take this            CONTINUE taking these medications      * albuterol (2.5 MG/3ML) 0.083% nebulizer solution  Commonly known as: PROVENTIL     * albuterol sulfate  (90 Base) MCG/ACT inhaler  Commonly known as: PROVENTIL;VENTOLIN;PROAIR     aspirin 81 MG tablet     buPROPion 150 MG extended release tablet  Commonly known as: WELLBUTRIN XL     Daliresp 500 MCG tablet  Generic drug: Roflumilast     dilTIAZem 120 MG extended release capsule  Commonly known as: CARDIZEM 12 HR     Eliquis 5 MG Tabs tablet  Generic drug: apixaban     fluticasone-salmeterol 500-50 MCG/DOSE diskus inhaler  Commonly known as: ADVAIR     gabapentin 100 MG capsule  Commonly known as: NEURONTIN  Take 1 capsule by mouth daily for 30 days. Take 2 tablets daily     insulin glargine 100 UNIT/ML injection vial  Commonly known as: LANTUS     metoprolol succinate 25 MG extended release tablet  Commonly known as: TOPROL XL     oxyCODONE-acetaminophen 5-325 MG per tablet  Commonly known as: PERCOCET  Take 1 tablet by mouth every 6 hours as needed for Pain for up to 7 days. Max Daily Amount: 4 tablets     OXYGEN     simvastatin 40 MG tablet  Commonly known as: ZOCOR     tamsulosin 0.4 MG capsule  Commonly known as: FLOMAX     tiotropium 18 MCG inhalation capsule  Commonly known as: SPIRIVA           * This list has 2 medication(s) that are the same as other medications prescribed for you. Read the directions carefully, and ask your doctor or other care provider to review them with you.                STOP taking these medications      lisinopril 10 MG tablet  Commonly known as: PRINIVIL;ZESTRIL               Where to Get Your Medications        These medications were sent to [facility] - P [phone] - F [phone]  [address] [place] [id number]      Phone: [phone]   azithromycin 500 MG tablet  bumetanide 0.25 MG/ML injection  predniSONE 20 MG tablet

## 2024-12-21 NOTE — PROGRESS NOTES
12/21/24 0804   Treatment   Treatment Type HHN;MDI   $Treatment Type $Inhaled Therapy/Meds   Medications Albuterol/Ipratropium;Budesonide/Formoterol   Pre-Tx Pulse 80   Pre-Tx Resps 14   Breath Sounds Pre-Tx ZHOU Expiratory wheezes   Breath Sounds Pre-Tx LLL Expiratory wheezes   Breath Sounds Pre-Tx RUL Expiratory wheezes   Breath Sounds Pre-Tx RML Expiratory wheezes   Breath Sounds Pre-Tx RLL Expiratory wheezes   Breath Sounds Post-Tx ZHOU Expiratory wheezes   Breath Sounds Post-Tx LLL Expiratory wheezes   Breath Sounds Post-Tx RUL Expiratory wheezes   Breath Sounds Post-Tx RML Expiratory wheezes   Breath Sounds Post-Tx RLL Expiratory wheezes   Post-Tx Pulse 78   Post-Tx Resps 16   Delivery Source Mask;Oxygen;MDI with spacer   Position Semi-Fishman's   Treatment Tolerance Well   Duration 10   Is patient on O2? Y   Oxygen Therapy/Pulse Ox   Pulse 75   Respirations 17   SpO2 100 %   Care Plan - Respiratory Goals   Achieves optimal ventilation and oxygenation Respiratory therapy support as indicated;Assess and instruct to report shortness of breath or any respiratory difficulty;Assess the need for suctioning and aspirate as needed;Encourage broncho-pulmonary hygiene including cough, deep breathe, incentive spirometry;Oxygen supplementation based on oxygen saturation or arterial blood gases;Position to facilitate oxygenation and minimize respiratory effort;Assess for changes in mentation and behavior;Assess for changes in respiratory status

## 2024-12-21 NOTE — PROCEDURES
SLP ALL NOTES  Facility/Department: [address] STE[address]    MODIFIED BARIUM SWALLOW STUDY (MBSS)    NAME: Salvador Hatch  : 1955   MRN: [id number]    ADMISSION DATE: 2024      ADMITTING DIAGNOSIS: has Atrial flutter (HCC); CAD (coronary artery disease); COPD (chronic obstructive pulmonary disease) (HCC); Osteoarthritis; DAVID (acute kidney injury) (HCC); Acute on chronic hypoxic respiratory failure; Pulmonary congestion; BPH (benign prostatic hyperplasia); Urinary retention; COPD with acute exacerbation (HCC); COPD exacerbation (HCC); and Acute respiratory distress on their problem list.      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    Recommend comparison to more recent prior imaging.  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     Moderate centrilobular emphysema.     Cardiomegaly with moderate coronary atherosclerotic calcifications.        Primary Complaint:    Salvador Hatch is an 68 yo man admitted 24 with SOB d/t acute on chronic respiratory failure with COPD exacerbation.    Per notes, pt struggles to chew and breathe and there was allegedly one instance where he choked on meat.          Modified Barium Swallow Study Results  Completed with ST and Radiology    Trials of:  Nectar Thick,   Thin liquids,   puree,   soft solid and   dry solid    ORAL PHASE SYMPTOMS DIET/LIQUID   Lip Closure WFL   Pt working hard to breath   Chewing/Deglutition Prolonged Chewing  Nonfunctional   For dry solids; challenging with soft solids d/t deficits in coordination of chew-breathe-swallow   Bolus Formation/Control WFL      Oral Stasis Not observed

## 2024-12-21 NOTE — PLAN OF CARE
Problem: Discharge Planning  Goal: Discharge to home or other facility with appropriate resources  Outcome: Progressing     Problem: ABCDS Injury Assessment  Goal: Absence of physical injury  Outcome: Progressing     Problem: Safety - Adult  Goal: Free from fall injury  Outcome: Progressing     Problem: Neurosensory - Adult  Goal: Achieves stable or improved neurological status  Outcome: Progressing     Problem: Respiratory - Adult  Goal: Achieves optimal ventilation and oxygenation  12/21/2024 0406 by Angie Lockwood RN  Outcome: Progressing  12/20/2024 2046 by Shantel Sheehan RCP  Outcome: Progressing     Problem: Cardiovascular - Adult  Goal: Maintains optimal cardiac output and hemodynamic stability  Outcome: Progressing     Problem: Skin/Tissue Integrity - Adult  Goal: Skin integrity remains intact  Outcome: Progressing  Flowsheets (Taken 12/20/2024 1923)  Skin Integrity Remains Intact: Monitor for areas of redness and/or skin breakdown     Problem: Musculoskeletal - Adult  Goal: Return mobility to safest level of function  Outcome: Progressing     Problem: Infection - Adult  Goal: Absence of infection at discharge  Outcome: Progressing     Problem: Metabolic/Fluid and Electrolytes - Adult  Goal: Electrolytes maintained within normal limits  Outcome: Progressing     Problem: Hematologic - Adult  Goal: Maintains hematologic stability  Outcome: Progressing     Problem: Skin/Tissue Integrity  Goal: Absence of new skin breakdown  Description: 1.  Monitor for areas of redness and/or skin breakdown  2.  Assess vascular access sites hourly  3.  Every 4-6 hours minimum:  Change oxygen saturation probe site  4.  Every 4-6 hours:  If on nasal continuous positive airway pressure, respiratory therapy assess nares and determine need for appliance change or resting period.  Outcome: Progressing     Problem: Pain  Goal: Verbalizes/displays adequate comfort level or baseline comfort level  Outcome: Progressing

## 2024-12-22 LAB
GLUCOSE BLD-MCNC: 144 MG/DL (ref 75–110)
GLUCOSE BLD-MCNC: 148 MG/DL (ref 75–110)
GLUCOSE BLD-MCNC: 218 MG/DL (ref 75–110)
GLUCOSE BLD-MCNC: 275 MG/DL (ref 75–110)
GLUCOSE BLD-MCNC: 372 MG/DL (ref 75–110)
GLUCOSE BLD-MCNC: 97 MG/DL (ref 75–110)

## 2024-12-22 PROCEDURE — 6370000000 HC RX 637 (ALT 250 FOR IP): Performed by: STUDENT IN AN ORGANIZED HEALTH CARE EDUCATION/TRAINING PROGRAM

## 2024-12-22 PROCEDURE — 99232 SBSQ HOSP IP/OBS MODERATE 35: CPT | Performed by: INTERNAL MEDICINE

## 2024-12-22 PROCEDURE — 94660 CPAP INITIATION&MGMT: CPT

## 2024-12-22 PROCEDURE — 2500000003 HC RX 250 WO HCPCS: Performed by: NURSE PRACTITIONER

## 2024-12-22 PROCEDURE — 6370000000 HC RX 637 (ALT 250 FOR IP): Performed by: NURSE PRACTITIONER

## 2024-12-22 PROCEDURE — 99232 SBSQ HOSP IP/OBS MODERATE 35: CPT | Performed by: STUDENT IN AN ORGANIZED HEALTH CARE EDUCATION/TRAINING PROGRAM

## 2024-12-22 PROCEDURE — 6370000000 HC RX 637 (ALT 250 FOR IP)

## 2024-12-22 PROCEDURE — 97530 THERAPEUTIC ACTIVITIES: CPT

## 2024-12-22 PROCEDURE — 82947 ASSAY GLUCOSE BLOOD QUANT: CPT

## 2024-12-22 PROCEDURE — 2700000000 HC OXYGEN THERAPY PER DAY

## 2024-12-22 PROCEDURE — 94640 AIRWAY INHALATION TREATMENT: CPT

## 2024-12-22 PROCEDURE — 6370000000 HC RX 637 (ALT 250 FOR IP): Performed by: INTERNAL MEDICINE

## 2024-12-22 PROCEDURE — 2060000000 HC ICU INTERMEDIATE R&B

## 2024-12-22 RX ORDER — BUMETANIDE 1 MG/1
2 TABLET ORAL DAILY
Status: DISCONTINUED | OUTPATIENT
Start: 2024-12-22 | End: 2024-12-24 | Stop reason: HOSPADM

## 2024-12-22 RX ORDER — INSULIN GLARGINE 100 [IU]/ML
10 INJECTION, SOLUTION SUBCUTANEOUS DAILY
Status: DISCONTINUED | OUTPATIENT
Start: 2024-12-22 | End: 2024-12-23

## 2024-12-22 RX ORDER — BUDESONIDE 0.25 MG/2ML
250 INHALANT ORAL 2 TIMES DAILY
Qty: 60 EACH | Refills: 1 | Status: SHIPPED | OUTPATIENT
Start: 2024-12-22

## 2024-12-22 RX ORDER — FINASTERIDE 5 MG/1
5 TABLET, FILM COATED ORAL DAILY
Status: DISCONTINUED | OUTPATIENT
Start: 2024-12-22 | End: 2024-12-24 | Stop reason: HOSPADM

## 2024-12-22 RX ORDER — FINASTERIDE 5 MG/1
5 TABLET, FILM COATED ORAL DAILY
Qty: 30 TABLET | Refills: 3 | Status: SHIPPED | OUTPATIENT
Start: 2024-12-22

## 2024-12-22 RX ORDER — REVEFENACIN 175 UG/3ML
1 SOLUTION RESPIRATORY (INHALATION) DAILY
Qty: 30 EACH | Refills: 1 | Status: SHIPPED | OUTPATIENT
Start: 2024-12-22

## 2024-12-22 RX ORDER — BUMETANIDE 2 MG/1
2 TABLET ORAL DAILY
COMMUNITY
Start: 2024-12-06

## 2024-12-22 RX ORDER — ARFORMOTEROL TARTRATE 15 UG/2ML
1 SOLUTION RESPIRATORY (INHALATION) 2 TIMES DAILY
Qty: 60 ML | Refills: 1 | Status: SHIPPED | OUTPATIENT
Start: 2024-12-22

## 2024-12-22 RX ADMIN — ALPRAZOLAM 1 MG: 1 TABLET ORAL at 19:49

## 2024-12-22 RX ADMIN — APIXABAN 5 MG: 5 TABLET, FILM COATED ORAL at 09:08

## 2024-12-22 RX ADMIN — TAMSULOSIN HYDROCHLORIDE 0.4 MG: 0.4 CAPSULE ORAL at 09:07

## 2024-12-22 RX ADMIN — SODIUM CHLORIDE, PRESERVATIVE FREE 10 ML: 5 INJECTION INTRAVENOUS at 19:49

## 2024-12-22 RX ADMIN — IPRATROPIUM BROMIDE AND ALBUTEROL SULFATE 1 DOSE: 2.5; .5 SOLUTION RESPIRATORY (INHALATION) at 20:28

## 2024-12-22 RX ADMIN — INSULIN LISPRO 16 UNITS: 100 INJECTION, SOLUTION INTRAVENOUS; SUBCUTANEOUS at 16:10

## 2024-12-22 RX ADMIN — APIXABAN 5 MG: 5 TABLET, FILM COATED ORAL at 19:49

## 2024-12-22 RX ADMIN — INSULIN GLARGINE 45 UNITS: 100 INJECTION, SOLUTION SUBCUTANEOUS at 19:48

## 2024-12-22 RX ADMIN — INSULIN LISPRO 8 UNITS: 100 INJECTION, SOLUTION INTRAVENOUS; SUBCUTANEOUS at 19:48

## 2024-12-22 RX ADMIN — SODIUM CHLORIDE, PRESERVATIVE FREE 10 ML: 5 INJECTION INTRAVENOUS at 09:08

## 2024-12-22 RX ADMIN — BUMETANIDE 2 MG: 1 TABLET ORAL at 09:07

## 2024-12-22 RX ADMIN — BUDESONIDE AND FORMOTEROL FUMARATE DIHYDRATE 2 PUFF: 80; 4.5 AEROSOL RESPIRATORY (INHALATION) at 20:28

## 2024-12-22 RX ADMIN — ASPIRIN 81 MG: 81 TABLET, COATED ORAL at 09:08

## 2024-12-22 RX ADMIN — METOPROLOL SUCCINATE 25 MG: 25 TABLET, EXTENDED RELEASE ORAL at 09:08

## 2024-12-22 RX ADMIN — IPRATROPIUM BROMIDE AND ALBUTEROL SULFATE 1 DOSE: 2.5; .5 SOLUTION RESPIRATORY (INHALATION) at 07:57

## 2024-12-22 RX ADMIN — INSULIN GLARGINE 10 UNITS: 100 INJECTION, SOLUTION SUBCUTANEOUS at 17:16

## 2024-12-22 RX ADMIN — IPRATROPIUM BROMIDE AND ALBUTEROL SULFATE 1 DOSE: 2.5; .5 SOLUTION RESPIRATORY (INHALATION) at 11:40

## 2024-12-22 RX ADMIN — ROFLUMILAST 500 MCG: 500 TABLET ORAL at 09:23

## 2024-12-22 RX ADMIN — OXYCODONE HYDROCHLORIDE AND ACETAMINOPHEN 1 TABLET: 5; 325 TABLET ORAL at 20:46

## 2024-12-22 RX ADMIN — PREDNISONE 40 MG: 20 TABLET ORAL at 09:08

## 2024-12-22 RX ADMIN — INSULIN LISPRO 4 UNITS: 100 INJECTION, SOLUTION INTRAVENOUS; SUBCUTANEOUS at 23:10

## 2024-12-22 RX ADMIN — FINASTERIDE 5 MG: 5 TABLET, FILM COATED ORAL at 17:17

## 2024-12-22 RX ADMIN — BUDESONIDE AND FORMOTEROL FUMARATE DIHYDRATE 2 PUFF: 80; 4.5 AEROSOL RESPIRATORY (INHALATION) at 07:57

## 2024-12-22 RX ADMIN — ATORVASTATIN CALCIUM 20 MG: 20 TABLET, FILM COATED ORAL at 09:07

## 2024-12-22 RX ADMIN — IPRATROPIUM BROMIDE AND ALBUTEROL SULFATE 1 DOSE: 2.5; .5 SOLUTION RESPIRATORY (INHALATION) at 16:28

## 2024-12-22 ASSESSMENT — PAIN SCALES - GENERAL
PAINLEVEL_OUTOF10: 8
PAINLEVEL_OUTOF10: 0
PAINLEVEL_OUTOF10: 8

## 2024-12-22 ASSESSMENT — PAIN DESCRIPTION - DESCRIPTORS: DESCRIPTORS: ACHING

## 2024-12-22 ASSESSMENT — PAIN DESCRIPTION - LOCATION: LOCATION: FOOT;BACK

## 2024-12-22 ASSESSMENT — PAIN SCALES - WONG BAKER: WONGBAKER_NUMERICALRESPONSE: NO HURT

## 2024-12-22 NOTE — CARE COORDINATION
Transitional Planning: Spoke to patient and updated that [facility] able [facility]. Pt. Is going to talk with his sister and see if that facility is acceptable for him.     Pt. Continues to be on Bipap       PT/OT recs: SNF      Continued needs/services:    Need accepting facility that family agrees for him to admit to.

## 2024-12-22 NOTE — PLAN OF CARE
Problem: Discharge Planning  Goal: Discharge to home or other facility with appropriate resources  12/22/2024 0751 by Kym Abernathy RN  Outcome: Progressing  12/22/2024 0352 by Angie Lockwood RN  Outcome: Progressing     Problem: ABCDS Injury Assessment  Goal: Absence of physical injury  12/22/2024 0751 by Kym Abernathy RN  Outcome: Progressing  12/22/2024 0352 by Angie Lockwood RN  Outcome: Progressing     Problem: Safety - Adult  Goal: Free from fall injury  12/22/2024 0751 by Kym Abernathy RN  Outcome: Progressing  12/22/2024 0352 by Angie Lockwood RN  Outcome: Progressing     Problem: Neurosensory - Adult  Goal: Achieves stable or improved neurological status  12/22/2024 0751 by Kym Abernathy RN  Outcome: Progressing  12/22/2024 0352 by Angie Lockwood RN  Outcome: Progressing     Problem: Respiratory - Adult  Goal: Achieves optimal ventilation and oxygenation  12/22/2024 0751 by Kym Abernathy RN  Outcome: Progressing  12/22/2024 0352 by Angie Lockwood RN  Outcome: Progressing     Problem: Cardiovascular - Adult  Goal: Maintains optimal cardiac output and hemodynamic stability  12/22/2024 0751 by Kym Abernathy RN  Outcome: Progressing  12/22/2024 0352 by Angie Lockwood RN  Outcome: Progressing     Problem: Skin/Tissue Integrity - Adult  Goal: Skin integrity remains intact  12/22/2024 0751 by Kym Abernathy RN  Outcome: Progressing  12/22/2024 0352 by Angie Lockwood RN  Outcome: Progressing  Flowsheets (Taken 12/21/2024 2000)  Skin Integrity Remains Intact: Monitor for areas of redness and/or skin breakdown     Problem: Musculoskeletal - Adult  Goal: Return mobility to safest level of function  12/22/2024 0751 by Kym Abernathy RN  Outcome: Progressing  12/22/2024 0352 by Angie Lockwood RN  Outcome: Progressing     Problem: Infection - Adult  Goal: Absence of infection at discharge  12/22/2024 0751 by Kym Abernathy RN  Outcome: Progressing  12/22/2024 0352 by Angie Lockwood RN  Outcome:  Progressing     Problem: Metabolic/Fluid and Electrolytes - Adult  Goal: Electrolytes maintained within normal limits  12/22/2024 0751 by Kym Abernathy RN  Outcome: Progressing  12/22/2024 0352 by Angie Lockwood RN  Outcome: Progressing     Problem: Hematologic - Adult  Goal: Maintains hematologic stability  12/22/2024 0751 by Kym Abernathy RN  Outcome: Progressing  12/22/2024 0352 by Angie Lockwood RN  Outcome: Progressing     Problem: Skin/Tissue Integrity  Goal: Absence of new skin breakdown  Description: 1.  Monitor for areas of redness and/or skin breakdown  2.  Assess vascular access sites hourly  3.  Every 4-6 hours minimum:  Change oxygen saturation probe site  4.  Every 4-6 hours:  If on nasal continuous positive airway pressure, respiratory therapy assess nares and determine need for appliance change or resting period.  12/22/2024 0751 by Kym Abernathy RN  Outcome: Progressing  12/22/2024 0352 by Angie Lockwood RN  Outcome: Progressing     Problem: Pain  Goal: Verbalizes/displays adequate comfort level or baseline comfort level  12/22/2024 0751 by Kym Abernathy RN  Outcome: Progressing  12/22/2024 0352 by Angie Lockwood RN  Outcome: Progressing

## 2024-12-22 NOTE — PROGRESS NOTES
Occupational Therapy  Occupational Therapy Daily Treatment Note  Facility/Department: [facility] 4A STEPDOWN   Patient Name: Salvador Hatch        MRN: [id number]    : 1955    Date of Service: 2024    Chief Complaint   Patient presents with    Shortness of Breath     Past Medical History:  has a past medical history of Atrial flutter ([facility]), CAD (coronary artery disease), COPD (chronic obstructive pulmonary disease) ([facility]), and Osteoarthritis.  Past Surgical History:  has no past surgical history on file.    Discharge Recommendations  Discharge Recommendations: Patient would benefit from continued therapy after discharge in order to increase pt balance, strength and independence with ADL tasks and functional transfers       Assessment  Performance deficits / Impairments: Decreased functional mobility ;Decreased ADL status;Decreased safe awareness;Decreased endurance;Decreased balance;Decreased high-level IADLs;Decreased strength  Prognosis: Fair  REQUIRES OT FOLLOW-UP: Yes  Activity Tolerance  Activity Tolerance: Patient limited by fatigue;Treatment limited secondary to medical complications (free text)  Activity Tolerance Comments: SOB/decreased endurance  Safety Devices  Type of Devices: Gait belt;Left in chair;Call light within reach;Nurse notified  Restraints  Restraints Initially in Place: No    AM-PAC  AM-[facility] Daily Activity - Inpatient   How much help is needed for putting on and taking off regular lower body clothing?: A Lot  How much help is needed for bathing (which includes washing, rinsing, drying)?: A Lot  How much help is needed for toileting (which includes using toilet, bedpan, or urinal)?: A Lot  How much help is needed for putting on and taking off regular upper body clothing?: A Lot  How much help is needed for taking care of personal grooming?: A Little  How much help for eating meals?: None  AM-[facility] Inpatient Daily Activity Raw Score: 15  AM-PAC Inpatient ADL T-Scale Score : 34.69  ADL  to don gown as simulated jacket seated EOB requiring increased assist d/t increased fatigue and SOB  Additional Comments: Further ADLs not attempted d/t pt fatigue and SOB requiring increased time to recover after functional transfers and ambulation    Balance  Balance  Sitting: With support (SBA static/dynamic sitting at EOB utilizing 1-2 hand support for balance tolerating approx 8 min)  Standing: With support (CGA static standing at EOB with RW tolerating approx 1 min)    Transfers/Mobility  Bed mobility  Supine to Sit: Contact guard assistance  Scooting: Contact guard assistance  Bed Mobility Comments: HOB elevated, utilizing bed rails    Transfers  Sit to stand: Contact guard assistance  Stand to sit: Contact guard assistance  Transfer Comments: utilizing RW requiring v/c on proper hand placement         Functional Mobility: Contact guard assistance  Functional Mobility Skilled Clinical Factors: EOB>chair taking approx 3 steps with RW CGA for safety demo 0 LOB limited by decreased endurance         Patient Education  Patient Education  Education Given To: Patient  Education Provided: Role of Therapy;Transfer Training;Precautions;Energy Conservation  Education Provided Comments: proper hand and foot placement; balance management; walker management; EC techniques and pursed lip breathing-F carry over  Education Method: Verbal  Barriers to Learning: None  Education Outcome: Continued education needed    Goals  Short Term Goals  Time Frame for Short Term Goals: By discharge, pt will  Short Term Goal 1: demo UB ADLs independently.  Short Term Goal 2: demo LB ADLs with CGA, adaptive techniques PRN.  Short Term Goal 3: demo functional transfers/mobility with SBA, LRAD PRN for engagement in ADLs.  Short Term Goal 4: demo dynamic standing during functional activities for 5+ mins with SBA, LRAD PRN.  Short Term Goal 5: engage in 20+ mins of functional activities to improve overall functional endurance and

## 2024-12-22 NOTE — PROGRESS NOTES
[facility]  Office: [phone]  Ha Ledezma DO, Marbin Josue DO, Charles Dueñas DO, Nabor Doyle DO, Kristi Cabral MD, Kena Purvis MD, Rohini Vasquez MD, Marija Hagan MD,  Latrell De La Cruz MD, Chaz Lynch MD, Sandra Bernal MD,  Tommy Rees DO, Elder Grace MD, Arnold Sabillon MD, Shyam Ledezma DO, Vale Billings MD,  Easton Norris DO, Laura Cotto MD, Aida Thompson MD, Joyce Walker MD, Nolvia Will MD,  Jimbo Perdue MD, Beau Doran MD, Santiago Carnes MD, Kevin Mccormick MD, Christ Beavers MD, Sabino Carrero MD, Geoffrey Norton DO, Sinan Mchugh MD, Shirley Waterhouse, CNP,  Sara Koroma CNP, Geoffrey Valdes, CNP,  Angy Jensen, KRISTOPHER, Allie Barcenas, CNP, Suzy Hickey, CNP, Mitra Mccray, CNP, Bebe Mendoza, CNP, ABILIO RollinsC, ABILIO MondragonC, Navya Hanna, CNP, Pao Ritchie, CNP,  Tosha Liang, CNP, Lesli Mirza, CNP,  Bonnie Munoz, CNP, Rachelle Bear, CNP         [facility]   IN-PATIENT SERVICE   [facility]    Progress Note    12/22/2024    4:41 PM    Name:   Salvador Hatch  MRN:     [id number]     Acct:      [id number]   Room:   [facility] Day:  4  Admit Date:  12/18/2024  5:37 PM    PCP:   No primary care provider on file.  Code Status:  DNR-CCA    Subjective:     C/C:   Chief Complaint   Patient presents with    Shortness of Breath     Interval History Status: not changed.     Seen and examined at bedside, currently on nasal cannula  Denies any new complaints  No acute events overnight      Brief History:     Per my colleague   \"69-year-old male with past medical history of COPD, emphysema, history of bronchoscopic lung volume reduction in 2022, chronic respiratory failure on home 4 to 5 L of oxygen, chronic prednisone, paroxysmal A-fib on Eliquis, CAD with stent, osteoarthritis, urinary retention with recent removal of Boston presents to the hospital with acute on chronic respiratory failure

## 2024-12-22 NOTE — PLAN OF CARE
Problem: Discharge Planning  Goal: Discharge to home or other facility with appropriate resources  Outcome: Progressing     Problem: ABCDS Injury Assessment  Goal: Absence of physical injury  Outcome: Progressing     Problem: Safety - Adult  Goal: Free from fall injury  Outcome: Progressing     Problem: Neurosensory - Adult  Goal: Achieves stable or improved neurological status  Outcome: Progressing     Problem: Respiratory - Adult  Goal: Achieves optimal ventilation and oxygenation  Outcome: Progressing     Problem: Cardiovascular - Adult  Goal: Maintains optimal cardiac output and hemodynamic stability  Outcome: Progressing     Problem: Skin/Tissue Integrity - Adult  Goal: Skin integrity remains intact  Outcome: Progressing  Flowsheets (Taken 12/21/2024 2000)  Skin Integrity Remains Intact: Monitor for areas of redness and/or skin breakdown     Problem: Musculoskeletal - Adult  Goal: Return mobility to safest level of function  Outcome: Progressing     Problem: Infection - Adult  Goal: Absence of infection at discharge  Outcome: Progressing     Problem: Metabolic/Fluid and Electrolytes - Adult  Goal: Electrolytes maintained within normal limits  Outcome: Progressing     Problem: Hematologic - Adult  Goal: Maintains hematologic stability  Outcome: Progressing     Problem: Skin/Tissue Integrity  Goal: Absence of new skin breakdown  Description: 1.  Monitor for areas of redness and/or skin breakdown  2.  Assess vascular access sites hourly  3.  Every 4-6 hours minimum:  Change oxygen saturation probe site  4.  Every 4-6 hours:  If on nasal continuous positive airway pressure, respiratory therapy assess nares and determine need for appliance change or resting period.  Outcome: Progressing     Problem: Pain  Goal: Verbalizes/displays adequate comfort level or baseline comfort level  Outcome: Progressing

## 2024-12-23 LAB
ANION GAP SERPL CALCULATED.3IONS-SCNC: 12 MMOL/L (ref 9–16)
BUN SERPL-MCNC: 28 MG/DL (ref 8–23)
CALCIUM SERPL-MCNC: 9.5 MG/DL (ref 8.6–10.4)
CHLORIDE SERPL-SCNC: 96 MMOL/L (ref 98–107)
CO2 SERPL-SCNC: 31 MMOL/L (ref 20–31)
CREAT SERPL-MCNC: 1.2 MG/DL (ref 0.7–1.2)
GFR, ESTIMATED: 65 ML/MIN/1.73M2
GLUCOSE BLD-MCNC: 102 MG/DL (ref 75–110)
GLUCOSE BLD-MCNC: 178 MG/DL (ref 75–110)
GLUCOSE BLD-MCNC: 189 MG/DL (ref 75–110)
GLUCOSE BLD-MCNC: 225 MG/DL (ref 75–110)
GLUCOSE BLD-MCNC: 359 MG/DL (ref 75–110)
GLUCOSE BLD-MCNC: 376 MG/DL (ref 75–110)
GLUCOSE BLD-MCNC: 89 MG/DL (ref 75–110)
GLUCOSE SERPL-MCNC: 171 MG/DL (ref 74–99)
POTASSIUM SERPL-SCNC: 4.1 MMOL/L (ref 3.7–5.3)
SODIUM SERPL-SCNC: 139 MMOL/L (ref 136–145)

## 2024-12-23 PROCEDURE — 2500000003 HC RX 250 WO HCPCS: Performed by: NURSE PRACTITIONER

## 2024-12-23 PROCEDURE — 94761 N-INVAS EAR/PLS OXIMETRY MLT: CPT

## 2024-12-23 PROCEDURE — 6370000000 HC RX 637 (ALT 250 FOR IP)

## 2024-12-23 PROCEDURE — 97116 GAIT TRAINING THERAPY: CPT

## 2024-12-23 PROCEDURE — 97110 THERAPEUTIC EXERCISES: CPT

## 2024-12-23 PROCEDURE — 2700000000 HC OXYGEN THERAPY PER DAY

## 2024-12-23 PROCEDURE — 6370000000 HC RX 637 (ALT 250 FOR IP): Performed by: STUDENT IN AN ORGANIZED HEALTH CARE EDUCATION/TRAINING PROGRAM

## 2024-12-23 PROCEDURE — 94660 CPAP INITIATION&MGMT: CPT

## 2024-12-23 PROCEDURE — 80048 BASIC METABOLIC PNL TOTAL CA: CPT

## 2024-12-23 PROCEDURE — 94640 AIRWAY INHALATION TREATMENT: CPT

## 2024-12-23 PROCEDURE — 6370000000 HC RX 637 (ALT 250 FOR IP): Performed by: INTERNAL MEDICINE

## 2024-12-23 PROCEDURE — 6370000000 HC RX 637 (ALT 250 FOR IP): Performed by: NURSE PRACTITIONER

## 2024-12-23 PROCEDURE — 2060000000 HC ICU INTERMEDIATE R&B

## 2024-12-23 PROCEDURE — 99232 SBSQ HOSP IP/OBS MODERATE 35: CPT | Performed by: STUDENT IN AN ORGANIZED HEALTH CARE EDUCATION/TRAINING PROGRAM

## 2024-12-23 PROCEDURE — 36415 COLL VENOUS BLD VENIPUNCTURE: CPT

## 2024-12-23 PROCEDURE — 82947 ASSAY GLUCOSE BLOOD QUANT: CPT

## 2024-12-23 RX ORDER — INSULIN GLARGINE 100 [IU]/ML
15 INJECTION, SOLUTION SUBCUTANEOUS DAILY
Status: DISCONTINUED | OUTPATIENT
Start: 2024-12-24 | End: 2024-12-24 | Stop reason: HOSPADM

## 2024-12-23 RX ADMIN — ASPIRIN 81 MG: 81 TABLET, COATED ORAL at 07:57

## 2024-12-23 RX ADMIN — INSULIN GLARGINE 45 UNITS: 100 INJECTION, SOLUTION SUBCUTANEOUS at 19:35

## 2024-12-23 RX ADMIN — INSULIN LISPRO 16 UNITS: 100 INJECTION, SOLUTION INTRAVENOUS; SUBCUTANEOUS at 15:50

## 2024-12-23 RX ADMIN — INSULIN LISPRO 4 UNITS: 100 INJECTION, SOLUTION INTRAVENOUS; SUBCUTANEOUS at 12:36

## 2024-12-23 RX ADMIN — IPRATROPIUM BROMIDE AND ALBUTEROL SULFATE 1 DOSE: 2.5; .5 SOLUTION RESPIRATORY (INHALATION) at 09:05

## 2024-12-23 RX ADMIN — APIXABAN 5 MG: 5 TABLET, FILM COATED ORAL at 07:56

## 2024-12-23 RX ADMIN — METOPROLOL SUCCINATE 25 MG: 25 TABLET, EXTENDED RELEASE ORAL at 07:57

## 2024-12-23 RX ADMIN — BUDESONIDE AND FORMOTEROL FUMARATE DIHYDRATE 2 PUFF: 80; 4.5 AEROSOL RESPIRATORY (INHALATION) at 19:53

## 2024-12-23 RX ADMIN — POLYETHYLENE GLYCOL 3350 17 G: 17 POWDER, FOR SOLUTION ORAL at 12:35

## 2024-12-23 RX ADMIN — AZITHROMYCIN DIHYDRATE 500 MG: 250 TABLET ORAL at 12:30

## 2024-12-23 RX ADMIN — IPRATROPIUM BROMIDE AND ALBUTEROL SULFATE 1 DOSE: 2.5; .5 SOLUTION RESPIRATORY (INHALATION) at 11:28

## 2024-12-23 RX ADMIN — SODIUM CHLORIDE, PRESERVATIVE FREE 10 ML: 5 INJECTION INTRAVENOUS at 19:35

## 2024-12-23 RX ADMIN — SODIUM CHLORIDE, PRESERVATIVE FREE 10 ML: 5 INJECTION INTRAVENOUS at 07:58

## 2024-12-23 RX ADMIN — IPRATROPIUM BROMIDE AND ALBUTEROL SULFATE 1 DOSE: 2.5; .5 SOLUTION RESPIRATORY (INHALATION) at 15:30

## 2024-12-23 RX ADMIN — IPRATROPIUM BROMIDE AND ALBUTEROL SULFATE 1 DOSE: 2.5; .5 SOLUTION RESPIRATORY (INHALATION) at 19:53

## 2024-12-23 RX ADMIN — PREDNISONE 40 MG: 20 TABLET ORAL at 07:57

## 2024-12-23 RX ADMIN — INSULIN LISPRO 4 UNITS: 100 INJECTION, SOLUTION INTRAVENOUS; SUBCUTANEOUS at 19:35

## 2024-12-23 RX ADMIN — FINASTERIDE 5 MG: 5 TABLET, FILM COATED ORAL at 07:57

## 2024-12-23 RX ADMIN — INSULIN GLARGINE 10 UNITS: 100 INJECTION, SOLUTION SUBCUTANEOUS at 07:56

## 2024-12-23 RX ADMIN — TAMSULOSIN HYDROCHLORIDE 0.4 MG: 0.4 CAPSULE ORAL at 07:57

## 2024-12-23 RX ADMIN — IPRATROPIUM BROMIDE AND ALBUTEROL SULFATE 1 DOSE: 2.5; .5 SOLUTION RESPIRATORY (INHALATION) at 23:51

## 2024-12-23 RX ADMIN — ALPRAZOLAM 1 MG: 1 TABLET ORAL at 19:35

## 2024-12-23 RX ADMIN — APIXABAN 5 MG: 5 TABLET, FILM COATED ORAL at 19:35

## 2024-12-23 RX ADMIN — ROFLUMILAST 500 MCG: 500 TABLET ORAL at 07:56

## 2024-12-23 RX ADMIN — ATORVASTATIN CALCIUM 20 MG: 20 TABLET, FILM COATED ORAL at 07:56

## 2024-12-23 RX ADMIN — BUMETANIDE 2 MG: 1 TABLET ORAL at 07:57

## 2024-12-23 NOTE — PLAN OF CARE
Problem: Discharge Planning  Goal: Discharge to home or other facility with appropriate resources  12/23/2024 0923 by Kym Abernathy RN  Outcome: Progressing  12/23/2024 0401 by Angie Lockwood RN  Outcome: Progressing     Problem: ABCDS Injury Assessment  Goal: Absence of physical injury  12/23/2024 0923 by Kym Abernathy RN  Outcome: Progressing  12/23/2024 0401 by Angie Lockwood RN  Outcome: Progressing     Problem: Safety - Adult  Goal: Free from fall injury  12/23/2024 0923 by Kym Abernathy RN  Outcome: Progressing  12/23/2024 0401 by Angie Lockwood RN  Outcome: Progressing     Problem: Neurosensory - Adult  Goal: Achieves stable or improved neurological status  12/23/2024 0923 by Kym Abernathy RN  Outcome: Progressing  12/23/2024 0401 by Angie Lockwood RN  Outcome: Progressing     Problem: Respiratory - Adult  Goal: Achieves optimal ventilation and oxygenation  12/23/2024 0923 by Kym Abernathy RN  Outcome: Progressing  12/23/2024 0401 by Angie Lockwood RN  Outcome: Progressing     Problem: Cardiovascular - Adult  Goal: Maintains optimal cardiac output and hemodynamic stability  12/23/2024 0923 by Kym Abernathy RN  Outcome: Progressing  12/23/2024 0401 by Angie Lockwood RN  Outcome: Progressing     Problem: Skin/Tissue Integrity - Adult  Goal: Skin integrity remains intact  12/23/2024 0923 by Kym Abernathy RN  Outcome: Progressing  12/23/2024 0401 by Angie Lockwood RN  Outcome: Progressing     Problem: Musculoskeletal - Adult  Goal: Return mobility to safest level of function  12/23/2024 0923 by Kym Abernathy RN  Outcome: Progressing  12/23/2024 0401 by Angie Lockwood RN  Outcome: Progressing     Problem: Infection - Adult  Goal: Absence of infection at discharge  12/23/2024 0923 by Kym Abernathy RN  Outcome: Progressing  12/23/2024 0401 by Angie Lockwood RN  Outcome: Progressing     Problem: Metabolic/Fluid and Electrolytes - Adult  Goal: Electrolytes maintained within normal

## 2024-12-23 NOTE — PLAN OF CARE
Problem: Discharge Planning  Goal: Discharge to home or other facility with appropriate resources  Outcome: Progressing     Problem: ABCDS Injury Assessment  Goal: Absence of physical injury  Outcome: Progressing     Problem: Safety - Adult  Goal: Free from fall injury  Outcome: Progressing     Problem: Neurosensory - Adult  Goal: Achieves stable or improved neurological status  Outcome: Progressing     Problem: Respiratory - Adult  Goal: Achieves optimal ventilation and oxygenation  Outcome: Progressing     Problem: Cardiovascular - Adult  Goal: Maintains optimal cardiac output and hemodynamic stability  Outcome: Progressing     Problem: Skin/Tissue Integrity - Adult  Goal: Skin integrity remains intact  Outcome: Progressing     Problem: Musculoskeletal - Adult  Goal: Return mobility to safest level of function  Outcome: Progressing     Problem: Infection - Adult  Goal: Absence of infection at discharge  Outcome: Progressing     Problem: Metabolic/Fluid and Electrolytes - Adult  Goal: Electrolytes maintained within normal limits  Outcome: Progressing     Problem: Hematologic - Adult  Goal: Maintains hematologic stability  Outcome: Progressing     Problem: Skin/Tissue Integrity  Goal: Absence of new skin breakdown  Description: 1.  Monitor for areas of redness and/or skin breakdown  2.  Assess vascular access sites hourly  3.  Every 4-6 hours minimum:  Change oxygen saturation probe site  4.  Every 4-6 hours:  If on nasal continuous positive airway pressure, respiratory therapy assess nares and determine need for appliance change or resting period.  Outcome: Progressing     Problem: Pain  Goal: Verbalizes/displays adequate comfort level or baseline comfort level  Outcome: Progressing

## 2024-12-23 NOTE — PROGRESS NOTES
CLINICAL PHARMACY NOTE: MEDS TO BEDS    Total # of Prescriptions Filled: 4   The following medications were delivered to the patient:  PREDNISONE   GABAPENTIN   AZITHROMYCIN 500MG TABS  ALPRAZOLAM 1MG    Additional Documentation:  DEL. X4 TO PT 12/21, 3:09P. $0. (GOT WRITTEN / PRINTED ALPRAZOLAM SCRIPT / CANCELLED GABAPENTIN SCRIPT. OXY RTS - LEFT PRINTED SCRIPT FOR PT. )

## 2024-12-23 NOTE — PROGRESS NOTES
Date/Time    CULTURE NO GROWTH 10/03/2012 09:20 AM    CULTURE  10/03/2012 09:20 AM     [facility] [address] [place] [id number] [phone]       Radiology:  XR CHEST PORTABLE    Result Date: 12/19/2024  1. Soft tissue fullness in the region of the left hilum and AP window with faint curvilinear radiopacities in the left hilar region of uncertain etiology and significance.  The soft tissue fullness may be due to adenopathy or mass. Recommend follow-up CT chest with contrast. 2. Increasing bibasilar opacity compared with the study performed approximately 4 hours earlier. This could be due to increasing atelectasis or pneumonia. 3. Possible trace pleural fluid layering posteriorly.     XR CHEST PORTABLE    Result Date: 12/18/2024  Mild cardiomegaly and mild central pulmonary congestion. Chronic obstructive lung changes with mild bibasilar atelectasis vs early infiltrates or scarring       Physical Examination:        General appearance:  alert, cooperative and no distress, obese  Mental Status:  oriented to person, place and time and normal affect  Lungs: Breath sounds improved from before, prolonged expiration  Heart:  regular rate and rhythm, no murmur  Abdomen:  soft, nontender, nondistended, normal bowel sounds, no masses, hepatomegaly, splenomegaly  Extremities: Trace pedal edema, no redness, tenderness in the calves  Skin:  no gross lesions, rashes, induration    Assessment:        Hospital Problems             Last Modified POA    * (Principal) COPD with acute exacerbation (HCC) 12/18/2024 Yes    Atrial flutter ([facility]) 12/18/2024 Yes    CAD (coronary artery disease) 12/18/2024 Yes    COPD (chronic obstructive pulmonary disease) (HCC) 12/18/2024 Yes    Osteoarthritis 12/18/2024 Yes    DAVID (acute kidney injury) ([facility]) 12/18/2024 Yes    Acute on chronic hypoxic respiratory failure 12/18/2024 Yes    Pulmonary congestion 12/18/2024 Yes    BPH (benign prostatic hyperplasia) 12/18/2024 Yes    Urinary  retention 12/18/2024 Yes    COPD exacerbation (HCC) 12/18/2024 Yes    Acute respiratory distress 12/19/2024 Yes       Plan:        Acute on chronic hypoxic respiratory failure with COPD exacerbation-CT chest showed concern for near complete collapse of left upper lobe with bronchial valve noted in the central left upper lobe bronchi.      Continue breathing treatments, resume home azithromycin 3 times a week, continue steroids, Roflumilast.  Continue supplemental oxygen, BiPAP at night and daytime as needed, uses trilogy ventilator at home.      Diastolic heart failure- Bumex resumed    Atrial flutter on Eliquis    CAD with history of stent, continue aspirin and Lipitor    Diabetes mellitus with hyperglycemia-diabetic regimen adjusted, also on steroids currently.    Urinary retention-secondary to BPH, Boston catheter placed, urology evaluated, recommend to continue Boston catheter in place and follow-up with primary urologist outpatient at [facility].Continue Flomax, finasteride    Dysphagia diet as recommended by swallow study.    Underlying CKD-continue to watch kidney function, avoid nephrotoxic drugs          Santiago Delgado Sra, MD  12/23/2024  5:20 PM

## 2024-12-23 NOTE — CARE COORDINATION
Pre-Cert initiated in [facility] for [place].  Auth ID [id number], Status is Pending. CM updated.

## 2024-12-23 NOTE — CARE COORDINATION
Transitional Planning: Spoke to patient today and after discussion with his sister he is agreeable to pursue placement at [facility].     Spoke to Rianna and informed of patient's agreement to come to facility for SN needs. She was also informed that we will start precert. She verbalized understanding.    PT/OT recs: SNF      Continued needs/services: Precert approval

## 2024-12-23 NOTE — PROGRESS NOTES
Physical Therapy  Facility/Department: [facility] 4A STEPDOWN  Physical Therapy Daily Treatment Note    Name: Salvador Hatch  : 1955  MRN: [id number]  Date of Service: 2024    Discharge Recommendations:  Patient would benefit from continued therapy after discharge   PT Equipment Recommendations  Equipment Needed: No  Other: continue to assess, getting a hospital bed but does not have a walker      Patient Diagnosis(es): The primary encounter diagnosis was COPD exacerbation ([facility]). Diagnoses of Acute respiratory distress, Urinary retention, DAVID (acute kidney injury) ([facility]), Primary osteoarthritis, unspecified site, and Anxiety were also pertinent to this visit.  Past Medical History:  has a past medical history of Atrial flutter ([facility]), CAD (coronary artery disease), COPD (chronic obstructive pulmonary disease) ([facility]), and Osteoarthritis.  Past Surgical History:  has no past surgical history on file.    Assessment  Body Structures, Functions, Activity Limitations Requiring Skilled Therapeutic Intervention: Decreased functional mobility ;Decreased strength;Decreased safe awareness;Decreased endurance;Decreased balance  Assessment: Pt completed sit to stands with CGA from chair with RW. Pt was able to ambulate 2'x2 reps forward and backwards with RW CGA with decreased step length and height and overall very slow with limitations due to SOB and fatigue. Pt's SPo2 levels above 93% on 5L during session. Pt would be unsafe to return home at this time due to limited ambulation, SOB with exertion, not having proper equipment and not having 24/7 care at this time. Pt would benefit from further therapy to address functional deficits to return home with support. Pt would need 24/7 care at this time, pt has aides for 35 hours/week which is not enough support at this time due to overall medical and functional status.  Therapy Prognosis: Fair  Activity Tolerance  Activity Tolerance: Patient limited by endurance;Patient limited  standing up from a chair using your arms?: A Little  How much help is needed walking in hospital room?: A Little  How much help is needed climbing 3-5 steps with a railing?: A Lot  AM-PAC Inpatient Mobility Raw Score : 17  AM-PAC Inpatient T-Scale Score : 42.13  Mobility Inpatient CMS 0-100% Score: 50.57  Mobility Inpatient CMS G-Code Modifier : CK    Goals  Short Term Goals  Time Frame for Short Term Goals: 14 visits  Short Term Goal 1: Pt indep with bed mobility  Short Term Goal 2: Pt transfer with SBA, least restrictive device  Short Term Goal 3: Pt ambulate 50 ft with least restrictive device and SBA  Short Term Goal 4: Pt negotiate 8 stairs with 1 rail and CGA as needed to enter home  Patient Goals   Patient Goals : To return home soon     Education  Patient Education  Education Given To: Patient  Education Provided: Role of Therapy;Orientation;Transfer Training;Mobility Training;Fall Prevention Strategies;Equipment  Education Method: Verbal  Barriers to Learning: None  Education Outcome: Verbalized understanding;Continued education needed    Therapy Time   Individual Concurrent Group Co-treatment   Time In 1406         Time Out 1445         Minutes 39         Timed Code Treatment Minutes: 35 Minutes       Adelso Clinton, PTA

## 2024-12-24 VITALS
HEIGHT: 71 IN | OXYGEN SATURATION: 100 % | HEART RATE: 103 BPM | SYSTOLIC BLOOD PRESSURE: 105 MMHG | DIASTOLIC BLOOD PRESSURE: 82 MMHG | BODY MASS INDEX: 31.27 KG/M2 | WEIGHT: 223.33 LBS | TEMPERATURE: 98.9 F | RESPIRATION RATE: 23 BRPM

## 2024-12-24 LAB
GLUCOSE BLD-MCNC: 109 MG/DL (ref 75–110)
GLUCOSE BLD-MCNC: 116 MG/DL (ref 75–110)
GLUCOSE BLD-MCNC: 128 MG/DL (ref 75–110)
GLUCOSE BLD-MCNC: 57 MG/DL (ref 75–110)

## 2024-12-24 PROCEDURE — 6370000000 HC RX 637 (ALT 250 FOR IP): Performed by: STUDENT IN AN ORGANIZED HEALTH CARE EDUCATION/TRAINING PROGRAM

## 2024-12-24 PROCEDURE — 94761 N-INVAS EAR/PLS OXIMETRY MLT: CPT

## 2024-12-24 PROCEDURE — 6360000002 HC RX W HCPCS: Performed by: STUDENT IN AN ORGANIZED HEALTH CARE EDUCATION/TRAINING PROGRAM

## 2024-12-24 PROCEDURE — G0008 ADMIN INFLUENZA VIRUS VAC: HCPCS | Performed by: STUDENT IN AN ORGANIZED HEALTH CARE EDUCATION/TRAINING PROGRAM

## 2024-12-24 PROCEDURE — 99239 HOSP IP/OBS DSCHRG MGMT >30: CPT | Performed by: STUDENT IN AN ORGANIZED HEALTH CARE EDUCATION/TRAINING PROGRAM

## 2024-12-24 PROCEDURE — 90656 IIV3 VACC NO PRSV 0.5 ML IM: CPT | Performed by: STUDENT IN AN ORGANIZED HEALTH CARE EDUCATION/TRAINING PROGRAM

## 2024-12-24 PROCEDURE — 82947 ASSAY GLUCOSE BLOOD QUANT: CPT

## 2024-12-24 PROCEDURE — 94640 AIRWAY INHALATION TREATMENT: CPT

## 2024-12-24 PROCEDURE — 6370000000 HC RX 637 (ALT 250 FOR IP): Performed by: NURSE PRACTITIONER

## 2024-12-24 PROCEDURE — 2500000003 HC RX 250 WO HCPCS: Performed by: NURSE PRACTITIONER

## 2024-12-24 PROCEDURE — 2700000000 HC OXYGEN THERAPY PER DAY

## 2024-12-24 PROCEDURE — 6370000000 HC RX 637 (ALT 250 FOR IP): Performed by: INTERNAL MEDICINE

## 2024-12-24 RX ORDER — FLUTICASONE PROPIONATE 50 MCG
2 SPRAY, SUSPENSION (ML) NASAL DAILY
Status: DISCONTINUED | OUTPATIENT
Start: 2024-12-24 | End: 2024-12-24 | Stop reason: HOSPADM

## 2024-12-24 RX ADMIN — BUMETANIDE 2 MG: 1 TABLET ORAL at 08:04

## 2024-12-24 RX ADMIN — ATORVASTATIN CALCIUM 20 MG: 20 TABLET, FILM COATED ORAL at 08:04

## 2024-12-24 RX ADMIN — INSULIN GLARGINE 15 UNITS: 100 INJECTION, SOLUTION SUBCUTANEOUS at 08:03

## 2024-12-24 RX ADMIN — IPRATROPIUM BROMIDE AND ALBUTEROL SULFATE 1 DOSE: 2.5; .5 SOLUTION RESPIRATORY (INHALATION) at 07:55

## 2024-12-24 RX ADMIN — TAMSULOSIN HYDROCHLORIDE 0.4 MG: 0.4 CAPSULE ORAL at 08:04

## 2024-12-24 RX ADMIN — FINASTERIDE 5 MG: 5 TABLET, FILM COATED ORAL at 08:05

## 2024-12-24 RX ADMIN — SODIUM CHLORIDE, PRESERVATIVE FREE 10 ML: 5 INJECTION INTRAVENOUS at 08:05

## 2024-12-24 RX ADMIN — IPRATROPIUM BROMIDE AND ALBUTEROL SULFATE 1 DOSE: 2.5; .5 SOLUTION RESPIRATORY (INHALATION) at 15:00

## 2024-12-24 RX ADMIN — Medication 16 G: at 02:57

## 2024-12-24 RX ADMIN — IPRATROPIUM BROMIDE AND ALBUTEROL SULFATE 1 DOSE: 2.5; .5 SOLUTION RESPIRATORY (INHALATION) at 11:13

## 2024-12-24 RX ADMIN — METOPROLOL SUCCINATE 25 MG: 25 TABLET, EXTENDED RELEASE ORAL at 08:04

## 2024-12-24 RX ADMIN — Medication 16 G: at 03:27

## 2024-12-24 RX ADMIN — APIXABAN 5 MG: 5 TABLET, FILM COATED ORAL at 08:04

## 2024-12-24 RX ADMIN — INFLUENZA A VIRUS A/VICTORIA/4897/2022 IVR-238 (H1N1) ANTIGEN (PROPIOLACTONE INACTIVATED), INFLUENZA A VIRUS A/THAILAND/8/2022 IVR-237 (H3N2) ANTIGEN (PROPIOLACTONE INACTIVATED), INFLUENZA B VIRUS B/AUSTRIA/1359417/2021 BVR-26 ANTIGEN (PROPIOLACTONE INACTIVATED) 0.5 ML: 15; 15; 15 INJECTION, SUSPENSION INTRAMUSCULAR at 10:50

## 2024-12-24 RX ADMIN — BUDESONIDE AND FORMOTEROL FUMARATE DIHYDRATE 2 PUFF: 80; 4.5 AEROSOL RESPIRATORY (INHALATION) at 08:06

## 2024-12-24 RX ADMIN — ROFLUMILAST 500 MCG: 500 TABLET ORAL at 08:03

## 2024-12-24 NOTE — DISCHARGE SUMMARY
valves noted in the central left upper lobe bronchi.  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  Recommend comparison to more recent prior imaging.  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 Moderate centrilobular emphysema. Cardiomegaly with moderate coronary atherosclerotic calcifications.     FL MODIFIED BARIUM SWALLOW W VIDEO    Result Date: 12/21/2024  1. Flash penetration without aspiration with the thin liquid substance by cup. 2. No penetration or aspiration with the remainder of the administered substances. Please see separate speech pathology report for full discussion of findings and recommendations.     XR CHEST PORTABLE    Result Date: 12/19/2024  1. Soft tissue fullness in the region of the left hilum and AP window with faint curvilinear radiopacities in the left hilar region of uncertain etiology and significance.  The soft tissue fullness may be due to adenopathy or mass. Recommend follow-up CT chest with contrast. 2. Increasing bibasilar opacity compared with the study performed approximately 4 hours earlier. This could be due to increasing atelectasis or pneumonia. 3. Possible trace pleural fluid layering posteriorly.     XR CHEST PORTABLE    Result Date: 12/18/2024  Mild cardiomegaly and mild central pulmonary congestion. Chronic obstructive lung changes with mild bibasilar atelectasis vs early infiltrates or scarring       Consultations:    Consults:     Final Specialist Recommendations/Findings:   IP CONSULT TO HOSPITALIST  IP CONSULT TO PULMONOLOGY  IP CONSULT TO UROLOGY  IP CONSULT TO HOME CARE NEEDS      The patient was seen and examined on day of discharge and this discharge summary is in conjunction with any daily progress note from day of discharge.    Discharge plan:     Disposition: [facility]    Physician Follow Up:     Barron David MD  [address]  [facility]  [id number] [phone]    Schedule an appointment as soon as possible for a visit in 4 week(s)  Follow up at [facility] for management of chronic bee    PCP    Schedule an appointment as soon as possible for a visit in 1 week(s)      Pulmonology    Schedule an appointment as soon as possible for a visit in 1 week(s)      [facility]  [address] [id number]  Schedule an appointment as soon as possible for a visit in 1 week(s)         Requiring Further Evaluation/Follow Up POST HOSPITALIZATION/Incidental Findings: As described above in radiology section    Diet: diabetic diet    Activity: As tolerated    Instructions to Patient: Continue taking medications as prescribed, follow-up with PCP, pulmonology, urology as outpatient    Discharge Medications:      Medication List        START taking these medications      arformoterol tartrate 15 MCG/2ML Nebu  Commonly known as: Brovana  Take 1 ampule by nebulization 2 times daily     azithromycin 500 MG tablet  Commonly known as: ZITHROMAX  Take 1 tablet by mouth Every Monday, Wednesday, and Friday     budesonide 0.25 MG/2ML nebulizer suspension  Commonly known as: Pulmicort  Take 2 mLs by nebulization 2 times daily     finasteride 5 MG tablet  Commonly known as: Proscar  Take 1 tablet by mouth daily     predniSONE 20 MG tablet  Commonly known as: DELTASONE  Take 40 mg till 12/23 and switch to home dose 20 mg daily     Yupelri 175 MCG/3ML Soln  Generic drug: revefenacin  Inhale 3 mLs into the lungs daily            CHANGE how you take these medications      ALPRAZolam 1 MG tablet  Commonly known as: XANAX  Take 1 tablet by mouth 2 times daily as needed for Sleep for up to 5 days. Max Daily Amount: 2 mg  What changed:   medication strength  how much to take  when to take this  reasons to take this     gabapentin 100 MG capsule  Commonly known as: NEURONTIN  Take 1 capsule by mouth daily for 30 days.  What changed: additional instructions

## 2024-12-24 NOTE — CARE COORDINATION
Transitional Planning: Precert approved, [facility]. Faxed STEVEN to [facility].      PT/OT recs:      Continued needs/services: Report called to [phone]

## 2024-12-24 NOTE — PLAN OF CARE
Problem: Respiratory - Adult  Goal: Achieves optimal ventilation and oxygenation  12/23/2024 2140 by Aislinn Perez RCP  Outcome: Progressing

## 2024-12-24 NOTE — DISCHARGE SUMMARY
IV removed. All personal belongings packed including phone and . Pt left with bee. Pt left via wheelchair to [facility]. Writer attempted to call report but was sent to voicemail. Writer left call back number.

## 2024-12-24 NOTE — PLAN OF CARE
Problem: Discharge Planning  Goal: Discharge to home or other facility with appropriate resources  12/24/2024 0747 by Kym Abernathy RN  Outcome: Progressing  12/24/2024 0406 by Angie Lockwood RN  Outcome: Progressing     Problem: ABCDS Injury Assessment  Goal: Absence of physical injury  12/24/2024 0747 by Kym Abernathy RN  Outcome: Progressing  12/24/2024 0406 by Angie Lockwood RN  Outcome: Progressing     Problem: Safety - Adult  Goal: Free from fall injury  12/24/2024 0747 by Kym Abernathy RN  Outcome: Progressing  12/24/2024 0406 by Angie Lockwood RN  Outcome: Progressing     Problem: Neurosensory - Adult  Goal: Achieves stable or improved neurological status  12/24/2024 0747 by Kym Abernathy RN  Outcome: Progressing  12/24/2024 0406 by Angie Lockwood RN  Outcome: Progressing     Problem: Respiratory - Adult  Goal: Achieves optimal ventilation and oxygenation  12/24/2024 0747 by Kym Abernathy RN  Outcome: Progressing  12/24/2024 0406 by Angie Lockwood RN  Outcome: Progressing  12/23/2024 2140 by Aislinn Perez RCP  Outcome: Progressing     Problem: Cardiovascular - Adult  Goal: Maintains optimal cardiac output and hemodynamic stability  12/24/2024 0747 by Kym Abernathy RN  Outcome: Progressing  12/24/2024 0406 by Angie Lockwood RN  Outcome: Progressing     Problem: Skin/Tissue Integrity - Adult  Goal: Skin integrity remains intact  12/24/2024 0747 by Kym Abernathy RN  Outcome: Progressing  12/24/2024 0406 by Angie Lockwood RN  Outcome: Progressing     Problem: Musculoskeletal - Adult  Goal: Return mobility to safest level of function  12/24/2024 0747 by Kym Abernathy RN  Outcome: Progressing  12/24/2024 0406 by Angie Lockwood RN  Outcome: Progressing     Problem: Infection - Adult  Goal: Absence of infection at discharge  12/24/2024 0747 by Kym Abernathy RN  Outcome: Progressing  12/24/2024 0406 by Angie Lockwood RN  Outcome: Progressing     Problem: Metabolic/Fluid and Electrolytes -

## 2024-12-24 NOTE — PLAN OF CARE
Problem: Respiratory - Adult  Goal: Achieves optimal ventilation and oxygenation  12/24/2024 0800 by Bobbi Card, RCP  Outcome: Progressing  Flowsheets (Taken 12/24/2024 0755)  Achieves optimal ventilation and oxygenation:   Assess for changes in respiratory status   Oxygen supplementation based on oxygen saturation or arterial blood gases   Assess and instruct to report shortness of breath or any respiratory difficulty   Respiratory therapy support as indicated

## 2024-12-24 NOTE — PROGRESS NOTES
Comprehensive Nutrition Assessment    Type and Reason for Visit:  Initial, LOS    Nutrition Recommendations/Plan:   Continue current diet + low kcal/high pro ONS TID as tolerated  Will monitor PO intake, wt, and POC     Malnutrition Assessment:  Malnutrition Status:  No malnutrition (12/24/24 0904)      Nutrition Assessment:    69 y.o.M admitted d/t SOB. pt assessed for LOS. PO intake reported as 50-75% of minced&nile diet. Pt is receiving Ensure HP TID. Per chart, wt stable x admission, no wt hx to assess. BGs  mg/dL x 24 hrs. Insulin noted to be adjusted. No acute nutrition concerns or interventions at this time. RD will monitor per protocol.    Nutrition Related Findings:    LBM on 12/23. +2 generalized edema. Wound Type: None       Current Nutrition Intake & Therapies:    Average Meal Intake: 51-75%  Average Supplements Intake: 51-75%  ADULT ORAL NUTRITION SUPPLEMENT; Breakfast, Lunch, Dinner; Low Calorie/High Protein Oral Supplement  ADULT DIET; Dysphagia - Minced and Moist; 4 carb choices (60 gm/meal)    Anthropometric Measures:  Height: 180.3 cm (5' 10.98\")  Ideal Body Weight (IBW): 172 lbs (78 kg)    Current Body Weight: 101.3 kg (223 lb 5.2 oz), 129.8 % IBW.    Current BMI (kg/m2): 31.2  BMI Categories: Obese Class 1 (BMI 30.0-34.9)    Estimated Daily Nutrient Needs:  Energy Requirements Based On: Formula  Weight Used for Energy Requirements: Current  Energy (kcal/day): 5182-0606 kcals/day  Weight Used for Protein Requirements: Ideal  Protein (g/day): 78-88 g/day  Method Used for Fluid Requirements: 1 ml/kcal  Fluid (ml/day): 1677-6067 ml/day    Nutrition Diagnosis:   No nutrition diagnosis at this time     Nutrition Interventions:   Food and/or Nutrient Delivery: Continue Current Diet, Continue Oral Nutrition Supplement  Nutrition Education/Counseling: No recommendation at this time  Coordination of Nutrition Care: Continue to monitor while inpatient    Nutrition Monitoring and Evaluation:

## 2024-12-24 NOTE — PLAN OF CARE
Problem: Discharge Planning  Goal: Discharge to home or other facility with appropriate resources  Outcome: Progressing     Problem: ABCDS Injury Assessment  Goal: Absence of physical injury  Outcome: Progressing     Problem: Safety - Adult  Goal: Free from fall injury  Outcome: Progressing     Problem: Neurosensory - Adult  Goal: Achieves stable or improved neurological status  Outcome: Progressing     Problem: Respiratory - Adult  Goal: Achieves optimal ventilation and oxygenation  12/24/2024 0406 by Angie Lockwood RN  Outcome: Progressing  12/23/2024 2140 by Aislinn Perez RCP  Outcome: Progressing     Problem: Cardiovascular - Adult  Goal: Maintains optimal cardiac output and hemodynamic stability  Outcome: Progressing     Problem: Skin/Tissue Integrity - Adult  Goal: Skin integrity remains intact  Outcome: Progressing     Problem: Musculoskeletal - Adult  Goal: Return mobility to safest level of function  Outcome: Progressing     Problem: Infection - Adult  Goal: Absence of infection at discharge  Outcome: Progressing     Problem: Metabolic/Fluid and Electrolytes - Adult  Goal: Electrolytes maintained within normal limits  Outcome: Progressing     Problem: Hematologic - Adult  Goal: Maintains hematologic stability  Outcome: Progressing     Problem: Skin/Tissue Integrity  Goal: Absence of new skin breakdown  Description: 1.  Monitor for areas of redness and/or skin breakdown  2.  Assess vascular access sites hourly  3.  Every 4-6 hours minimum:  Change oxygen saturation probe site  4.  Every 4-6 hours:  If on nasal continuous positive airway pressure, respiratory therapy assess nares and determine need for appliance change or resting period.  Outcome: Progressing     Problem: Pain  Goal: Verbalizes/displays adequate comfort level or baseline comfort level  Outcome: Progressing

## 2024-12-29 LAB
EKG ATRIAL RATE: 82 BPM
EKG P AXIS: 78 DEGREES
EKG P-R INTERVAL: 170 MS
EKG Q-T INTERVAL: 456 MS
EKG QRS DURATION: 94 MS
EKG QTC CALCULATION (BAZETT): 532 MS
EKG R AXIS: 108 DEGREES
EKG T AXIS: 90 DEGREES
EKG VENTRICULAR RATE: 82 BPM